# Patient Record
Sex: MALE | Race: WHITE
[De-identification: names, ages, dates, MRNs, and addresses within clinical notes are randomized per-mention and may not be internally consistent; named-entity substitution may affect disease eponyms.]

---

## 2020-04-27 ENCOUNTER — HOSPITAL ENCOUNTER (EMERGENCY)
Dept: HOSPITAL 4 - SED | Age: 85
Discharge: PSYCHIATRIC OR SUBSTANCE USE FACILITY | End: 2020-04-27
Payer: COMMERCIAL

## 2020-04-27 ENCOUNTER — HOSPITAL ENCOUNTER (INPATIENT)
Dept: HOSPITAL 36 - GERO | Age: 85
LOS: 1 days | Discharge: ANOTHER HOSPITAL | DRG: 884 | End: 2020-04-28
Attending: PSYCHIATRY & NEUROLOGY | Admitting: PSYCHIATRY & NEUROLOGY
Payer: MEDICARE

## 2020-04-27 VITALS — SYSTOLIC BLOOD PRESSURE: 104 MMHG

## 2020-04-27 VITALS — SYSTOLIC BLOOD PRESSURE: 102 MMHG | WEIGHT: 150 LBS | HEIGHT: 70 IN | BODY MASS INDEX: 21.47 KG/M2

## 2020-04-27 VITALS — SYSTOLIC BLOOD PRESSURE: 143 MMHG | DIASTOLIC BLOOD PRESSURE: 73 MMHG

## 2020-04-27 DIAGNOSIS — F03.91: Primary | ICD-10-CM

## 2020-04-27 DIAGNOSIS — F29: ICD-10-CM

## 2020-04-27 DIAGNOSIS — I10: ICD-10-CM

## 2020-04-27 DIAGNOSIS — F41.9: ICD-10-CM

## 2020-04-27 DIAGNOSIS — Z79.899: ICD-10-CM

## 2020-04-27 DIAGNOSIS — G20: ICD-10-CM

## 2020-04-27 DIAGNOSIS — R45.1: Primary | ICD-10-CM

## 2020-04-27 LAB
ALBUMIN SERPL BCP-MCNC: 3.6 G/DL (ref 3.4–4.8)
ALT SERPL W P-5'-P-CCNC: 26 U/L (ref 12–78)
AMPHETAMINES UR QL SCN: NEGATIVE
ANION GAP SERPL CALCULATED.3IONS-SCNC: 10 MMOL/L (ref 5–15)
APAP SERPL-MCNC: < 1 UG/ML (ref 1–30)
APPEARANCE UR: CLEAR
AST SERPL W P-5'-P-CCNC: 26 U/L (ref 10–37)
BACTERIA URNS QL MICRO: (no result) /HPF
BARBITURATES UR QL SCN: NEGATIVE
BASOPHILS # BLD AUTO: 0 K/UL (ref 0–0.2)
BASOPHILS NFR BLD AUTO: 0.3 % (ref 0–2)
BENZODIAZ UR QL SCN: NEGATIVE
BILIRUB SERPL-MCNC: 0.8 MG/DL (ref 0–1)
BILIRUB UR QL STRIP: NEGATIVE
BUN SERPL-MCNC: 14 MG/DL (ref 8–21)
BZE UR QL SCN: NEGATIVE
CALCIUM SERPL-MCNC: 9 MG/DL (ref 8.4–11)
CANNABINOIDS UR QL SCN: NEGATIVE
CHLORIDE SERPL-SCNC: 100 MMOL/L (ref 98–107)
CHOLEST SERPL-MCNC: 142 MG/DL (ref ?–200)
COLOR UR: YELLOW
CREAT SERPL-MCNC: 0.81 MG/DL (ref 0.55–1.3)
EOSINOPHIL # BLD AUTO: 0 K/UL (ref 0–0.4)
EOSINOPHIL NFR BLD AUTO: 0.2 % (ref 0–4)
ERYTHROCYTE [DISTWIDTH] IN BLOOD BY AUTOMATED COUNT: 14.6 % (ref 9–15)
ETHANOL SERPL-MCNC: < 3 MG/DL (ref ?–10)
GFR SERPL CREATININE-BSD FRML MDRD: (no result) ML/MIN (ref 90–?)
GLUCOSE SERPL-MCNC: 116 MG/DL (ref 70–99)
GLUCOSE UR STRIP-MCNC: NEGATIVE MG/DL
HCT VFR BLD AUTO: 42 % (ref 36–54)
HDLC SERPL-MCNC: 66 MG/DL (ref 45–?)
HGB BLD-MCNC: 14 G/DL (ref 14–18)
HGB UR QL STRIP: (no result)
KETONES UR STRIP-MCNC: NEGATIVE MG/DL
LDL CHOLESTEROL: 63 MG/DL (ref ?–100)
LEUKOCYTE ESTERASE UR QL STRIP: NEGATIVE
LYMPHOCYTES # BLD AUTO: 2.8 K/UL (ref 1–5.5)
LYMPHOCYTES NFR BLD AUTO: 17.4 % (ref 20.5–51.5)
MCH RBC QN AUTO: 30 PG (ref 27–31)
MCHC RBC AUTO-ENTMCNC: 33 % (ref 32–36)
MCV RBC AUTO: 90 FL (ref 79–98)
METHADONE UR-SCNC: NEGATIVE UMOL/L
METHAMPHET UR-SCNC: NEGATIVE UMOL/L
MONOCYTES # BLD MANUAL: 0.5 K/UL (ref 0–1)
MONOCYTES # BLD MANUAL: 3.1 % (ref 1.7–9.3)
NEUTROPHILS # BLD AUTO: 12.7 K/UL (ref 1.8–7.7)
NEUTROPHILS NFR BLD AUTO: 79 % (ref 40–70)
NITRITE UR QL STRIP: NEGATIVE
OPIATES UR QL SCN: NEGATIVE
OXYCODONE SERPL-MCNC: NEGATIVE NG/ML
PCP UR QL SCN: NEGATIVE
PH UR STRIP: 8 [PH] (ref 5–8)
PLATELET # BLD AUTO: 174 K/UL (ref 130–430)
POTASSIUM SERPL-SCNC: 4.8 MMOL/L (ref 3.5–5.1)
PROT UR QL STRIP: NEGATIVE
RBC # BLD AUTO: 4.66 MIL/UL (ref 4.2–6.2)
RBC #/AREA URNS HPF: (no result) /HPF (ref 0–3)
SODIUM SERPLBLD-SCNC: 137 MMOL/L (ref 136–145)
SP GR UR STRIP: 1.01 (ref 1–1.03)
TRICYCLICS UR-MCNC: NEGATIVE NG/ML
TRIGL SERPL-MCNC: 66 MG/DL (ref 30–150)
URINE PROPOXYPHENE SCREEN: NEGATIVE
UROBILINOGEN UR STRIP-MCNC: 1 MG/DL (ref 0.2–1)
WBC # BLD AUTO: 16 K/UL (ref 4.8–10.8)
WBC #/AREA URNS HPF: (no result) /HPF (ref 0–3)

## 2020-04-27 PROCEDURE — 83036 HEMOGLOBIN GLYCOSYLATED A1C: CPT

## 2020-04-27 PROCEDURE — 36415 COLL VENOUS BLD VENIPUNCTURE: CPT

## 2020-04-27 PROCEDURE — 80307 DRUG TEST PRSMV CHEM ANLYZR: CPT

## 2020-04-27 PROCEDURE — 80061 LIPID PANEL: CPT

## 2020-04-27 PROCEDURE — 87081 CULTURE SCREEN ONLY: CPT

## 2020-04-27 PROCEDURE — 81000 URINALYSIS NONAUTO W/SCOPE: CPT

## 2020-04-27 PROCEDURE — G0481 DRUG TEST DEF 8-14 CLASSES: HCPCS

## 2020-04-27 PROCEDURE — G0480 DRUG TEST DEF 1-7 CLASSES: HCPCS

## 2020-04-27 PROCEDURE — Z7610: HCPCS

## 2020-04-27 PROCEDURE — 80053 COMPREHEN METABOLIC PANEL: CPT

## 2020-04-27 PROCEDURE — 85025 COMPLETE CBC W/AUTO DIFF WBC: CPT

## 2020-04-27 PROCEDURE — 99285 EMERGENCY DEPT VISIT HI MDM: CPT

## 2020-04-27 PROCEDURE — G0482 DRUG TEST DEF 15-21 CLASSES: HCPCS

## 2020-04-28 ENCOUNTER — HOSPITAL ENCOUNTER (INPATIENT)
Dept: HOSPITAL 4 - SED | Age: 85
LOS: 4 days | Discharge: PSYCHIATRIC OR SUBSTANCE USE FACILITY | DRG: 177 | End: 2020-05-02
Attending: INTERNAL MEDICINE | Admitting: INTERNAL MEDICINE
Payer: COMMERCIAL

## 2020-04-28 VITALS — WEIGHT: 150 LBS | BODY MASS INDEX: 22.73 KG/M2 | HEIGHT: 68 IN | SYSTOLIC BLOOD PRESSURE: 120 MMHG

## 2020-04-28 DIAGNOSIS — Z79.899: ICD-10-CM

## 2020-04-28 DIAGNOSIS — Z88.8: ICD-10-CM

## 2020-04-28 DIAGNOSIS — Z20.828: ICD-10-CM

## 2020-04-28 DIAGNOSIS — D64.9: ICD-10-CM

## 2020-04-28 DIAGNOSIS — G93.41: ICD-10-CM

## 2020-04-28 DIAGNOSIS — J69.0: Primary | ICD-10-CM

## 2020-04-28 DIAGNOSIS — Z88.6: ICD-10-CM

## 2020-04-28 DIAGNOSIS — F03.91: ICD-10-CM

## 2020-04-28 DIAGNOSIS — F29: ICD-10-CM

## 2020-04-28 DIAGNOSIS — G20: ICD-10-CM

## 2020-04-28 LAB
ALBUMIN SERPL BCP-MCNC: 3.2 G/DL (ref 3.4–4.8)
ALT SERPL W P-5'-P-CCNC: 25 U/L (ref 12–78)
ANION GAP SERPL CALCULATED.3IONS-SCNC: 7 MMOL/L (ref 5–15)
AST SERPL W P-5'-P-CCNC: 25 U/L (ref 10–37)
BASOPHILS # BLD AUTO: 0 K/UL (ref 0–0.2)
BASOPHILS NFR BLD AUTO: 0.3 % (ref 0–2)
BILIRUB SERPL-MCNC: 1 MG/DL (ref 0–1)
BUN SERPL-MCNC: 13 MG/DL (ref 8–21)
CALCIUM SERPL-MCNC: 9.1 MG/DL (ref 8.4–11)
CHLORIDE SERPL-SCNC: 99 MMOL/L (ref 98–107)
CREAT SERPL-MCNC: 0.73 MG/DL (ref 0.55–1.3)
EOSINOPHIL # BLD AUTO: 0 K/UL (ref 0–0.4)
EOSINOPHIL NFR BLD AUTO: 0.3 % (ref 0–4)
ERYTHROCYTE [DISTWIDTH] IN BLOOD BY AUTOMATED COUNT: 14.3 % (ref 9–15)
GFR SERPL CREATININE-BSD FRML MDRD: (no result) ML/MIN (ref 90–?)
GLUCOSE SERPL-MCNC: 111 MG/DL (ref 70–99)
HCT VFR BLD AUTO: 38.1 % (ref 36–54)
HGB BLD-MCNC: 12.9 G/DL (ref 14–18)
INR PPP: 1 (ref 0.8–1.2)
LYMPHOCYTES # BLD AUTO: 1.8 K/UL (ref 1–5.5)
LYMPHOCYTES NFR BLD AUTO: 18.3 % (ref 20.5–51.5)
MCH RBC QN AUTO: 30 PG (ref 27–31)
MCHC RBC AUTO-ENTMCNC: 34 % (ref 32–36)
MCV RBC AUTO: 90 FL (ref 79–98)
MONOCYTES # BLD MANUAL: 0.4 K/UL (ref 0–1)
MONOCYTES # BLD MANUAL: 4.4 % (ref 1.7–9.3)
NEUTROPHILS # BLD AUTO: 7.7 K/UL (ref 1.8–7.7)
NEUTROPHILS NFR BLD AUTO: 76.7 % (ref 40–70)
PLATELET # BLD AUTO: 271 K/UL (ref 130–430)
POTASSIUM SERPL-SCNC: 4.1 MMOL/L (ref 3.5–5.1)
PROTHROMBIN TIME: 10.3 SECS (ref 9.5–12.5)
RBC # BLD AUTO: 4.25 MIL/UL (ref 4.2–6.2)
SODIUM SERPLBLD-SCNC: 137 MMOL/L (ref 136–145)
WBC # BLD AUTO: 10 K/UL (ref 4.8–10.8)

## 2020-04-28 PROCEDURE — U0002 COVID-19 LAB TEST NON-CDC: HCPCS

## 2020-04-28 PROCEDURE — G0378 HOSPITAL OBSERVATION PER HR: HCPCS

## 2020-04-28 PROCEDURE — G0481 DRUG TEST DEF 8-14 CLASSES: HCPCS

## 2020-04-28 PROCEDURE — G0480 DRUG TEST DEF 1-7 CLASSES: HCPCS

## 2020-04-28 PROCEDURE — G0482 DRUG TEST DEF 15-21 CLASSES: HCPCS

## 2020-04-28 NOTE — PSYCHIATRIC EVALUATION
DATE OF SERVICE:  04/28/2020



JUSTIFICATION FOR HOSPITALIZATION:  Agitation, irritated, striking out at staff.



HISTORY OF PRESENT ILLNESS:  An 84-year-old male apparently aggressive,

agitated, banging on tables, poor sleep, irritated, striking out at nursing

staff, essentially refusing to speak with me this morning, but per staff, he has

been yelling and throwing himself off his chair, throwing his feces around the

facility.



PAST PSYCHIATRIC HISTORY:  Unclear.  Noted dementia.



FAMILY HISTORY:  Unclear.



SOCIAL HISTORY:  Apparently, the patient told staff he is from [facility], I am

not sure.  Per the facesheet, it looks like he is coming from a skilled nursing

in [place].  Wife and daughter listed on the facesheet, involvement unclear.



MEDICAL HISTORY:  Noted.



MEDICATIONS:  Noted.



MENTAL STATUS EXAMINATION:  Stated age, sleeping, arousable, refusing to speak

with me.  Unable to assess further.  Concerns for impulse control, agitation.



DIAGNOSES:  Dementia, dementia with behaviors; mood, unspecified; anxiety,

unspecified; psychosis, unspecified.



MEDICAL:  Please see full H and P.



ESTIMATED LENGTH OF STAY:  7-10 days.



ASSESSMENT:  The patient requiring hospitalization, aggressive, agitated,

striking out, worsening behaviors, unruly.  Nursing staff concerned about their

own safety.  The patient apparently also trying to throw himself off the chair,

throwing feces, which is documented.



PLAN:  Treatment plan includes group as well as milieu therapy.  We will make

appropriate medication adjustments.



CONDITIONS FOR DISCHARGE:  Improved mood, improved affect, better control of any

aggressive symptom.





DD: 04/28/2020 12:04

DT: 04/28/2020 12:20

JOB# [id number]  [id number]

## 2020-04-28 NOTE — CONSULTATION
DATE OF CONSULTATION:04/28/20  



INTERNAL MEDICINE CONSULTATION



HISTORY OF PRESENT ILLNESS:  We have 84-year-old male with dementia and

Parkinson who is transferred from nursing home for agitation.  The patient was

combative.  The patient was throwing his feces at people.  The patient was

screaming and yelling.



At this time, the patient was confused.  The patient was cleared at [facility].



PAST MEDICAL HISTORY:

1.  Parkinson.

2.  Hypertension.

3.  Dementia.



MEDICATIONS:  List reviewed.



ALLERGIES:  None.



SOCIAL HISTORY:  Unobtainable.



REVIEW OF SYSTEMS:  Difficult to obtain.



PHYSICAL EXAMINATION:

VITAL SIGNS:  Temperature is 98.0, pulse 91, respirations 20, blood pressure

109/59, satting 98%.

HEENT:  Normocephalic, atraumatic head exam.

NECK:  Supple.

CARDIOVASCULAR:  Regular rate and rhythm.

LUNGS:  Decreased breath sounds.

ABDOMEN:  Soft, nontender.

EXTREMITIES:  No edema, cyanosis or clubbing.



ASSESSMENT AND PLAN:

1.  End-stage dementia with behavioral manifestations.

2.  Parkinson.

3.  Anxiety.

4.  Acute psychosis.



The patient will continue with supportive care.  I reviewed the medical records

from the nursing home.  I have discussed the care plan with nursing staff.





DD: 04/28/2020 16:33

DT: 04/28/2020 19:37

JOB# [id number]  [id number]

MELISSA

## 2020-04-29 VITALS — SYSTOLIC BLOOD PRESSURE: 104 MMHG

## 2020-04-29 VITALS — SYSTOLIC BLOOD PRESSURE: 111 MMHG

## 2020-04-29 VITALS — SYSTOLIC BLOOD PRESSURE: 100 MMHG

## 2020-04-29 VITALS — SYSTOLIC BLOOD PRESSURE: 102 MMHG

## 2020-04-29 VITALS — SYSTOLIC BLOOD PRESSURE: 101 MMHG

## 2020-04-29 VITALS — SYSTOLIC BLOOD PRESSURE: 115 MMHG

## 2020-04-29 RX ADMIN — DEXTROSE MONOHYDRATE SCH MLS/HR: 50 INJECTION, SOLUTION INTRAVENOUS at 11:00

## 2020-04-29 RX ADMIN — PROPRANOLOL HYDROCHLORIDE SCH MG: 10 TABLET ORAL at 21:39

## 2020-04-29 RX ADMIN — PROPRANOLOL HYDROCHLORIDE SCH MG: 10 TABLET ORAL at 09:00

## 2020-04-29 RX ADMIN — PROPRANOLOL HYDROCHLORIDE SCH MG: 10 TABLET ORAL at 21:00

## 2020-04-29 RX ADMIN — CARBIDOPA AND LEVODOPA SCH TAB: 25; 100 TABLET ORAL at 21:38

## 2020-04-29 RX ADMIN — CARBIDOPA AND LEVODOPA SCH TAB: 25; 100 TABLET ORAL at 15:24

## 2020-04-29 RX ADMIN — CARBIDOPA AND LEVODOPA SCH TAB: 25; 100 TABLET ORAL at 09:25

## 2020-04-30 VITALS — SYSTOLIC BLOOD PRESSURE: 108 MMHG

## 2020-04-30 VITALS — SYSTOLIC BLOOD PRESSURE: 117 MMHG

## 2020-04-30 VITALS — SYSTOLIC BLOOD PRESSURE: 93 MMHG

## 2020-04-30 VITALS — SYSTOLIC BLOOD PRESSURE: 122 MMHG

## 2020-04-30 VITALS — SYSTOLIC BLOOD PRESSURE: 95 MMHG

## 2020-04-30 VITALS — SYSTOLIC BLOOD PRESSURE: 106 MMHG

## 2020-04-30 RX ADMIN — DONEPEZIL HYDROCHLORIDE SCH MG: 5 TABLET, FILM COATED ORAL at 20:36

## 2020-04-30 RX ADMIN — TRAZODONE HYDROCHLORIDE SCH MG: 50 TABLET ORAL at 20:35

## 2020-04-30 RX ADMIN — CARBIDOPA AND LEVODOPA SCH TAB: 25; 100 TABLET ORAL at 14:16

## 2020-04-30 RX ADMIN — CARBIDOPA AND LEVODOPA SCH TAB: 25; 100 TABLET ORAL at 20:35

## 2020-04-30 RX ADMIN — CARBIDOPA AND LEVODOPA SCH TAB: 25; 100 TABLET ORAL at 09:00

## 2020-04-30 RX ADMIN — DEXTROSE MONOHYDRATE SCH MLS/HR: 50 INJECTION, SOLUTION INTRAVENOUS at 10:21

## 2020-04-30 RX ADMIN — PROPRANOLOL HYDROCHLORIDE SCH MG: 10 TABLET ORAL at 21:00

## 2020-04-30 RX ADMIN — PROPRANOLOL HYDROCHLORIDE SCH MG: 10 TABLET ORAL at 09:00

## 2020-05-01 VITALS — SYSTOLIC BLOOD PRESSURE: 97 MMHG

## 2020-05-01 VITALS — SYSTOLIC BLOOD PRESSURE: 112 MMHG

## 2020-05-01 VITALS — SYSTOLIC BLOOD PRESSURE: 107 MMHG

## 2020-05-01 VITALS — SYSTOLIC BLOOD PRESSURE: 110 MMHG

## 2020-05-01 VITALS — SYSTOLIC BLOOD PRESSURE: 103 MMHG

## 2020-05-01 RX ADMIN — DONEPEZIL HYDROCHLORIDE SCH MG: 5 TABLET, FILM COATED ORAL at 20:51

## 2020-05-01 RX ADMIN — CARBIDOPA AND LEVODOPA SCH TAB: 25; 100 TABLET ORAL at 15:07

## 2020-05-01 RX ADMIN — PROPRANOLOL HYDROCHLORIDE SCH MG: 10 TABLET ORAL at 09:00

## 2020-05-01 RX ADMIN — DEXTROSE MONOHYDRATE SCH MLS/HR: 50 INJECTION, SOLUTION INTRAVENOUS at 10:12

## 2020-05-01 RX ADMIN — CARBIDOPA AND LEVODOPA SCH TAB: 25; 100 TABLET ORAL at 20:51

## 2020-05-01 RX ADMIN — TRAZODONE HYDROCHLORIDE SCH MG: 50 TABLET ORAL at 20:51

## 2020-05-01 RX ADMIN — PROPRANOLOL HYDROCHLORIDE SCH MG: 10 TABLET ORAL at 21:00

## 2020-05-01 RX ADMIN — CARBIDOPA AND LEVODOPA SCH TAB: 25; 100 TABLET ORAL at 08:43

## 2020-05-02 ENCOUNTER — HOSPITAL ENCOUNTER (INPATIENT)
Dept: HOSPITAL 36 - GERO | Age: 85
LOS: 9 days | Discharge: ANOTHER HOSPITAL | DRG: 884 | End: 2020-05-11
Attending: PSYCHIATRY & NEUROLOGY | Admitting: PSYCHIATRY & NEUROLOGY
Payer: MEDICARE

## 2020-05-02 VITALS — SYSTOLIC BLOOD PRESSURE: 121 MMHG

## 2020-05-02 VITALS — SYSTOLIC BLOOD PRESSURE: 133 MMHG

## 2020-05-02 VITALS — SYSTOLIC BLOOD PRESSURE: 102 MMHG

## 2020-05-02 VITALS — SYSTOLIC BLOOD PRESSURE: 116 MMHG

## 2020-05-02 DIAGNOSIS — J18.9: ICD-10-CM

## 2020-05-02 DIAGNOSIS — I10: ICD-10-CM

## 2020-05-02 DIAGNOSIS — Z79.899: ICD-10-CM

## 2020-05-02 DIAGNOSIS — F03.91: Primary | ICD-10-CM

## 2020-05-02 DIAGNOSIS — Z20.828: ICD-10-CM

## 2020-05-02 DIAGNOSIS — F41.9: ICD-10-CM

## 2020-05-02 PROCEDURE — Z7610: HCPCS

## 2020-05-02 RX ADMIN — CARBIDOPA AND LEVODOPA SCH TAB: 25; 100 TABLET ORAL at 09:07

## 2020-05-02 RX ADMIN — CARBIDOPA AND LEVODOPA SCH TAB: 25; 100 TABLET ORAL at 15:00

## 2020-05-02 RX ADMIN — DEXTROSE MONOHYDRATE SCH MLS/HR: 50 INJECTION, SOLUTION INTRAVENOUS at 11:05

## 2020-05-02 RX ADMIN — CARBIDOPA AND LEVODOPA SCH TAB: 10; 100 TABLET ORAL at 21:08

## 2020-05-02 RX ADMIN — PROPRANOLOL HYDROCHLORIDE SCH MG: 10 TABLET ORAL at 09:07

## 2020-05-03 VITALS — SYSTOLIC BLOOD PRESSURE: 108 MMHG | DIASTOLIC BLOOD PRESSURE: 62 MMHG

## 2020-05-03 RX ADMIN — IPRATROPIUM BROMIDE AND ALBUTEROL SULFATE SCH: .5; 3 SOLUTION RESPIRATORY (INHALATION) at 15:19

## 2020-05-03 RX ADMIN — CARBIDOPA AND LEVODOPA SCH TAB: 10; 100 TABLET ORAL at 15:23

## 2020-05-03 RX ADMIN — IPRATROPIUM BROMIDE AND ALBUTEROL SULFATE SCH: .5; 3 SOLUTION RESPIRATORY (INHALATION) at 19:00

## 2020-05-03 RX ADMIN — CARBIDOPA AND LEVODOPA SCH TAB: 10; 100 TABLET ORAL at 20:45

## 2020-05-03 RX ADMIN — CARBIDOPA AND LEVODOPA SCH TAB: 10; 100 TABLET ORAL at 09:20

## 2020-05-03 NOTE — CONSULTATION
DATE OF CONSULTATION:  05/03/2020



INTERNAL MEDICINE CONSULTATION



HISTORY OF PRESENT ILLNESS:  We have an 84-year-old male with dementia with

psychosis who is transferred from [facility].  The patient was sent

there to rule out for COVID-19.  The patient's COVID negative x 2.  The patient

was transferred here for continuing care of pneumonia.



No chest pain or shortness of breath.  No nausea, vomiting, or abdominal pain.



PAST MEDICAL HISTORY:

1.  Dementia with psychosis.

2.  Hypertension.



MEDICATIONS:  List reviewed.



ALLERGIES:  None.



PHYSICAL EXAMINATION:

VITAL SIGNS:  Temperature 98.1, pulse 79, respirations 20, blood pressure 93/59,

satting 96% on room air.

HEENT:  Normocephalic, atraumatic head exam.

NECK:  Supple.

CARDIOVASCULAR:  Regular rate and rhythm.

LUNGS:  Decreased breath sounds.

ABDOMEN:  Soft, nontender.

EXTREMITIES:  No edema, cyanosis or clubbing.



ASSESSMENT AND PLAN:

1.  Community-acquired pneumonia.

2.  Dementia with psychosis.



The patient will be continued on Levaquin 500 mg p.o. daily.  The patient will

get breathing treatments q. 6 hours.  The patient's pulse ox will be monitored. 

We will co-manage with the psychiatrist.





DD: 05/03/2020 11:37

DT: 05/03/2020 12:25

JOB# [id number]  [id number]

## 2020-05-03 NOTE — PSYCHIATRIC EVALUATION
DATE OF SERVICE:  05/03/2020



JUSTIFICATION FOR HOSPITALIZATION:  Originally agitation and irritation,

irritated striking out at staff.



HISTORY OF PRESENT ILLNESS:  An 84-year-old male came to this hospital, was sent

to [facility] for medical decompensation, now readmitted was apparently banging

on tables, poor sleep, irritated, striking out at nursing staff.  When I go see

him, I am not able to get much information from him.  He is refusing to talk to

me, just stares at me, does not say anything.  The patient noted to be

continuously getting out of bed, unsteady gait, notable confusion and having

hard time controlling his behaviors.  He is not listening to any staff.



PAST PSYCHIATRIC HISTORY:  Recent admission here.



FAMILY HISTORY:  Noncontributory.



SOCIAL HISTORY:  Very hard to assess, not talking.  The patient told staff he is

from [facility] coming in from a skilled nursing.



MEDICAL HISTORY:  Noted.



MEDICATIONS:  Noted.



MENTAL STATUS EXAMINATION:  Stated age.  Sleeping opens his eyes, does not talk

to me at all.  Concerns for impulse control, agitation, restlessness,

confusional state.



DIAGNOSES:  Dementia, dementia with behaviors; mood, unspecified; anxiety,

unspecified; psychosis, unspecified.



MEDICAL:  Please see full H and P.



ESTIMATED LENGTH OF STAY:  7-10 days.



ASSESSMENT:  The patient requiring hospitalization.  Aggressive behaviors,

agitation, striking out.  Poorly oriented, trying to get up, throwing himself

off the chair.  At some point, throwing feces, which was documented when he was

here originally a few days prior.



PLAN:  Treatment plan includes group as well as milieu therapy.  We will make

appropriate medication adjustments, monitor for any overt side effects.





DD: 05/03/2020 15:01

DT: 05/03/2020 17:26

JOB# [id number]  [id number]

## 2020-05-04 RX ADMIN — CARBIDOPA AND LEVODOPA SCH TAB: 10; 100 TABLET ORAL at 09:09

## 2020-05-04 RX ADMIN — CARBIDOPA AND LEVODOPA SCH TAB: 10; 100 TABLET ORAL at 20:23

## 2020-05-04 RX ADMIN — IPRATROPIUM BROMIDE AND ALBUTEROL SULFATE SCH: .5; 3 SOLUTION RESPIRATORY (INHALATION) at 08:08

## 2020-05-04 RX ADMIN — CARBIDOPA AND LEVODOPA SCH TAB: 10; 100 TABLET ORAL at 13:49

## 2020-05-04 RX ADMIN — IPRATROPIUM BROMIDE AND ALBUTEROL SULFATE SCH: .5; 3 SOLUTION RESPIRATORY (INHALATION) at 13:50

## 2020-05-04 RX ADMIN — IPRATROPIUM BROMIDE AND ALBUTEROL SULFATE SCH: .5; 3 SOLUTION RESPIRATORY (INHALATION) at 18:58

## 2020-05-04 NOTE — INTERNAL MEDICINE PROG NOTE
Internal Medicine Subjective





- Subjective


Service Date: 20


Patient seen and examined:: without staff


Patient is:: asleep





Internal Medicine Objective





- Results


Recent Labs: 


 Laboratory Last Values











POC Glucose  145 MG/DL (70 - 105)  H  20  20:25    














- Physical Exam


Vitals and I&O: 


 Vital Signs











Temp  98.2 F   20 06:26


 


Pulse  77   20 09:09


 


Resp  18   20 06:26


 


BP  111/67   20 09:09


 


Pulse Ox  98   20 06:26








 Intake & Output











 20





 18:59 06:59 18:59


 


Intake Total 900  


 


Balance 900  


 


Intake:   


 


  Oral 900  


 


Other:   


 


  # Voids  3 


 


  # Bowel Movements  0 


 


  Stool Characteristics  Soft 





  Formed 





  Brown 











Active Medications: 


Current Medications





Acetaminophen (Tylenol)  650 mg PO Q4H PRN


   PRN Reason: Pain (Mild 1-3)


   Stop: 20 19:32


Acetaminophen (Tylenol Extra Strength)  1,000 mg PO Q6H PRN


   PRN Reason: Pain (Moderate 4-6)


   Stop: 20 19:32


Al Hydrox/Mg Hydrox/Simethicone (Maalox)  30 ml PO Q4HR PRN


   PRN Reason: GI DISTRESS


   Stop: 20 19:32


Albuterol/Ipratropium (Duoneb Neb)  3 ml HHN [id number] [facility]


   Stop: 20 12:59


   Last Admin: 20 08:08 Dose:  Not Given


Bisacodyl (Dulcolax 10 Mg Supp)  10 mg RC DAILY PRN


   PRN Reason: constipation


   Stop: 20 11:23


Carbidopa/Levodopa (Sinemet 10 Mg-100 Mg)  1 tab PO TID [facility]


   Stop: 20 20:59


   Last Admin: 20 09:09 Dose:  1 tab


Donepezil HCl (Aricept)  5 mg PO DAILY [facility]


   Stop: 20 08:59


   Last Admin: 20 09:08 Dose:  5 mg


Ibuprofen (Motrin)  400 mg PO Q4H PRN


   PRN Reason: Pain (Severe 7-10)


   Stop: 20 19:32


Levofloxacin (Levaquin)  500 mg PO DAILY [facility]


   Stop: 20 08:59


   Last Admin: 20 09:08 Dose:  500 mg


Magnesium Hydroxide (Milk Of Magnesia)  30 ml PO HS PRN


   PRN Reason: Constipation


Multivitamins/Vitamin C (Theragran)  1 tab PO DAILY [facility]


   Stop: 20 08:59


   Last Admin: 20 09:09 Dose:  1 tab


Propranolol HCl (Inderal)  10 mg PO BID [facility]


   Stop: 20 08:59


   Last Admin: 20 09:09 Dose:  10 mg


Zolpidem Tartrate (Ambien)  5 mg PO HS PRN


   PRN Reason: Insomnia


   Stop: 20 19:32








General: demented


HEENT: NC/AT


Neck: Supple


Lungs: CTAB


Cardiovascular: RRR, Normal S1, Normal S2


Abdomen: soft, non-tender


Extremities: clear





Internal Medicine Assmt/Plan





- Assessment


Assessment: 





1.  Community-acquired pneumonia


2.  HTN


3.  Dementia with psychosis





- Plan


Plan: 





continue supportive care.


continue levaquin 500 mg daily


patient does not appear to be in respiratory distress





Nutritional Asmnt/Malnutr-PDOC





- Dietary Evaluation


Malnutrition Findings (Please click <Entered> for more info): 








Nutritional Asmnt/Malnutrition                             Start:  20 13:

10


Text:                                                      Status: Complete    

  


Freq:                                                                          

  


Protocol:                                                                      

  


 Document     20 13:10  ESSIE  (Rec: 20 13:15  ESSIE  [email or web address]

-01)


 Nutritional Asmnt/Malnutrition


     Patient General Information


      Nutritional Screening                      High Risk


      Diagnosis                                  Dementia with psychosis


      Pertinent Medical Hx/Surgical Hx           Dementia with psychosis, HTN


      Subjective Information                     Pt is a 84-year-old male re-


                                                 admitted on  d/t psychosis


                                                 after being sent to [facility] to r/o COVID-19.  Pt


                                                 ate 50% dinner and snacks on  per Meal/Nutrition Activity


                                                 Record.


                                                 Recommend adding fish oil to


                                                 diet regimen d/t dry skin and


                                                 low BMI. Spoke with pt nurse


                                                 Nico today pertaining to


                                                 recommendation. Let nurse know


                                                 we can get pt higher calorie


                                                 snacks in the kitchen and


                                                 provide softer food choices as


                                                 the chopped burger seemed to


                                                 be too tough for pt. Provided


                                                 nurse with food options to


                                                 discuss with pt at snack and


                                                 meal time.


                                                 Anthropometrics


                                                 HT: 511


                                                 WT: 111 LB (50.45 kg)


                                                 BMI: 15.50 (Underweight)


                                                 GI/ Skin Integrity


                                                 GI: WNL, Soft, Flat, Non-


                                                 tender


                                                 BM: 5/2 x1


                                                 I/O: 300/1 (+299)


                                                 Skin: WNL, Intact, Dryness


                                                 Yoshi: 16


                                                 Diet Order: Mechanical Soft


                                                 Estimated Energy Needs: (


                                                 Underweight, CBW)


                                                 4684-2467 kcals (30-35 kcals/


                                                 kg)


                                                 60-65g Pro (1.2-1.3 g/kg)


                                                 3317-1477 ml (30-25 ml/kg)


      Current Diet Order/ Nutrition Support      Mechanical Soft


      Pertinent Medications                      Maalox (PRN), Albuterol (PRN),


                                                 Dulcolax (PRN), MOM (PRN),


                                                 Theragran


      Pertinent Labs                             POC Glucose (last 24 hours):


                                                 145


     Nutritional Hx/Data


      Height                                     1.8 m


      Height (Calculated Centimeters)            180.3


      Current Weight (lbs)                       50.349 kg


      Weight (Calculated Kilograms)              50.3


      Weight (Calculated Grams)                  47160.8


      Ideal Body Weight                          172 LB (78.18 kg)


      % Ideal Body Weight                        65


      Body Mass Index (BMI)                      15.5


      Weight Status                              Emaciated


     GI Symptoms


      Last BM                                    52 x1


      Skin Integrity/Comment:                    Skin: WNL, Intact, Dryness


                                                 Yoshi: 16


      Current %PO                                Fair (50-74%)


     Estimated Nutritional Goals


      BEE in Kcals:                              Using Current wt


      Calories/Kcals/Kg                          30-35


      Kcals Calculated                           1301-3924


      Protein:                                   Using Current wt


      Protein g/k.2-1.3


      Protein Calculated                         60-65


      Fluid: ml                                  3668-0370 ml (30-25 ml/kg)


     Nutritional Problem


      1. Problem


       Problem                                   Underweight


       Etiology                                  r/t consistent energy


                                                 underconsumption


       Signs/Symptoms:                           aeb BMI >18.5 (15.5).


     Malnutrition Related to Morbid Obesity


      Malnutrition related to morbid obesity     No


     Intervention/Recommendation


      Comments                                   1. Continue Mechanical Soft


                                                 diet as tolerated.


                                                 2. Add Ensure Enlive TID (


                                                 completed).


                                                 3. Recommend adding fish oil


                                                 to medication regimen.


     Expected Outcomes/Goals


      Expected Outcomes/Goals                    1. PO intake to meet >75% of


                                                 estimated nutritional needs.


                                                 2. Gradual weight gain (1.0 LB


                                                 /week) trending toward IBW


                                                 preferred.


                                                 3. Weekly weight checks.


                                                 4. Monitor PO intake, wt,


                                                 nutrition related labs, and


                                                 skin integrity.


                                                 5. F/U as high risk in 2-3


                                                 days, 5/5-5/6.

## 2020-05-04 NOTE — PROGRESS NOTES
DATE:  05/04/2020



SUBJECTIVE:  An 84-year-old male, currently in the hospital, dementia,

confusion.  When I approached him, he asked in a language he speaks, he says he

can speak English, but he does not really answering any of my questions

whatsoever, just asks for water.  Ongoing disorientation.  It seems he has a

history of dementia.  He has been generally calmer and not exhibiting any

behavioral problems, no agitation, no escalation.  Mostly withdrawn, keeps to

self, fair sleep and appetite.



Medications were reviewed.  Labs were reviewed.  Vitals were reviewed.  No overt

side effects.



MENTAL STATUS EXAMINATION:  Stated age, calm, awake, alert, but confused,

somewhat impulsive, but not creating any sort of disturbance.  Poor orientation.



DIAGNOSIS:  Dementia.



PLAN:  We will continue inpatient monitoring.  We will attempt to contact family

and it seems that the patient may be approaching his baseline, it is unclear. 

We will monitor for any impulse control issues or changes in mental status.





DD: 05/04/2020 19:31

DT: 05/04/2020 19:41

[facility]# [id number]  [id number]

## 2020-05-05 RX ADMIN — IPRATROPIUM BROMIDE AND ALBUTEROL SULFATE SCH: .5; 3 SOLUTION RESPIRATORY (INHALATION) at 06:50

## 2020-05-05 RX ADMIN — CARBIDOPA AND LEVODOPA SCH TAB: 10; 100 TABLET ORAL at 20:57

## 2020-05-05 RX ADMIN — CARBIDOPA AND LEVODOPA SCH TAB: 10; 100 TABLET ORAL at 09:32

## 2020-05-05 RX ADMIN — IPRATROPIUM BROMIDE AND ALBUTEROL SULFATE SCH: .5; 3 SOLUTION RESPIRATORY (INHALATION) at 12:36

## 2020-05-05 RX ADMIN — IPRATROPIUM BROMIDE AND ALBUTEROL SULFATE SCH: .5; 3 SOLUTION RESPIRATORY (INHALATION) at 18:34

## 2020-05-05 RX ADMIN — CARBIDOPA AND LEVODOPA SCH TAB: 10; 100 TABLET ORAL at 14:47

## 2020-05-05 NOTE — INTERNAL MEDICINE PROG NOTE
Chief Complaint: CKD4    HPI: Jeffy Serrano is a 89 year old male for follow up on CKD and associated complications.    REVIEW OF SYSTEMS    Constitutional: Negative for fever, chills, diaphoresis, activity change, appetite change, fatigue and unexpected weight change.  HENT: Negative for nosebleeds, sore throat, facial swelling, mouth sores, trouble swallowing, neck pain, neck stiffness and postnasal drip.  Eyes: Negative for pain, discharge and redness.  Respiratory: Negative for apnea, cough, chest tightness, shortness of breath and wheezing.  Cardiovascular: Negative for chest pain and palpitations.  Gastrointestinal: Negative for nausea, vomiting, abdominal pain, diarrhea and abdominal distention.  Genitourinary: Negative for dysuria, urgency, frequency, hematuria, flank pain, decreased urine volume and difficulty urinating.  Musculoskeletal: Negative for myalgias, back pain, joint swelling, arthralgias and gait problem.  Skin: Negative for pallor and rash.  Neurological: Negative for dizziness, tremors, speech difficulty, weakness, numbness and headaches.  Hematological: Does not bruise/bleed easily.  Psychiatric/Behavioral: Negative for confusion, sleep disturbance and agitation. The patient is not nervous/anxious.    Past Medical History:   Diagnosis Date   • Benign neoplasm of colon 12/3/08    tubular adenoma   • Cerebral embolism with cerebral infarction (CMS/HCC) 12/25/03   • Diabetes mellitus with stage 3 chronic kidney disease (CMS/HCC) 1/15/2015   • Diverticulosis of colon (without mention of hemorrhage) 12/3/08    scattered   • Iron deficiency anemia, unspecified 12/3/08   • Other specified gastritis without mention of hemorrhage 12/3/08    H Pylori negative   • Phimosis 8/17/2017   • Prostate cancer (CMS/HCC) 3/5/15    Diagnosed at [facility]   • Unspecified deficiency anemia 7/27/2011   • Unspecified hemorrhoids without mention of complication 12/3/08    internal     ALLERGIES:  No Known  Internal Medicine Subjective





- Subjective


Service Date: 20


Patient seen and examined:: without staff


Patient is:: asleep





Internal Medicine Objective





- Results


Recent Labs: 


 Laboratory Last Values











POC Glucose  145 MG/DL (70 - 105)  H  20  20:25    














- Physical Exam


Vitals and I&O: 


 Vital Signs











Temp  98.0 F   20 14:00


 


Pulse  69   20 14:00


 


Resp  20   20 14:00


 


BP  102/58   20 14:00


 


Pulse Ox  95   20 14:00








 Intake & Output











 20





 18:59 06:59 18:59


 


Intake Total 960 360 


 


Output Total  1 


 


Balance 960 359 


 


Intake:   


 


  Oral 960 360 


 


Output:   


 


  Urine/Stool Mix  1 


 


Other:   


 


  # Voids 3 2 


 


  # Bowel Movements 0 0 


 


  Stool Characteristics Soft Soft Soft





 Formed Formed Formed





 Brown Brown Brown











Active Medications: 


Current Medications





Acetaminophen (Tylenol)  650 mg PO Q4H PRN


   PRN Reason: Pain (Mild 1-3)


   Stop: 20 19:32


Acetaminophen (Tylenol Extra Strength)  1,000 mg PO Q6H PRN


   PRN Reason: Pain (Moderate 4-6)


   Stop: 20 19:32


Al Hydrox/Mg Hydrox/Simethicone (Maalox)  30 ml PO Q4HR PRN


   PRN Reason: GI DISTRESS


   Stop: 20 19:32


Albuterol/Ipratropium (Duoneb Neb)  3 ml HHN [id number] Former[facility], [facility]


   Stop: 20 12:59


   Last Admin: 20 12:36 Dose:  Not Given


Bisacodyl (Dulcolax 10 Mg Supp)  10 mg RC DAILY PRN


   PRN Reason: constipation


   Stop: 20 11:23


Carbidopa/Levodopa (Sinemet 10 Mg-100 Mg)  1 tab PO TID [facility]


   Stop: 20 20:59


   Last Admin: 20 14:47 Dose:  1 tab


Donepezil HCl (Aricept)  5 mg PO DAILY [facility]


   Stop: 20 08:59


   Last Admin: 20 09:31 Dose:  5 mg


Ibuprofen (Motrin)  400 mg PO Q4H PRN


   PRN Reason: Pain (Severe 7-10)


   Stop: 20 19:32


Levofloxacin (Levaquin)  500 mg PO DAILY [facility]


   Stop: 20 08:59


   Last Admin: 20 09:31 Dose:  500 mg


Magnesium Hydroxide (Milk Of Magnesia)  30 ml PO HS PRN


   PRN Reason: Constipation


Multivitamins/Vitamin C (Theragran)  1 tab PO DAILY PHILOMENA


   Stop: 20 08:59


   Last Admin: 20 09:31 Dose:  1 tab


Propranolol HCl (Inderal)  10 mg PO BID [facility]


   Stop: 20 08:59


   Last Admin: 20 09:31 Dose:  10 mg


Zolpidem Tartrate (Ambien)  5 mg PO HS PRN


   PRN Reason: Insomnia


   Stop: 20 19:32








General: demented


HEENT: NC/AT


Neck: Supple


Lungs: CTAB


Cardiovascular: RRR, Normal S1, Normal S2


Abdomen: soft, non-tender


Extremities: clear





Internal Medicine Assmt/Plan





- Assessment


Assessment: 





1.  Community-acquired pneumonia


2.  HTN


3.  Dementia with psychosis





- Plan


Plan: 





continue supportive care.


continue levaquin 500 mg daily


d.w r.n.





Nutritional Asmnt/Malnutr-PDOC





- Dietary Evaluation


Malnutrition Findings (Please click <Entered> for more info): 








Nutritional Asmnt/Malnutrition                             Start:  20 13:

10


Text:                                                      Status: Complete    

  


Freq:                                                                          

  


Protocol:                                                                      

  


 Document     20 13:10  ESSIE  (Rec: 20 13:15  ESSIE  [email or web address]

-01)


 Nutritional Asmnt/Malnutrition


     Patient General Information


      Nutritional Screening                      High Risk


      Diagnosis                                  Dementia with psychosis


      Pertinent Medical Hx/Surgical Hx           Dementia with psychosis, HTN


      Subjective Information                     Pt is a 84-year-old male re-


                                                 admitted on  d/t psychosis


                                                 after being sent to [facility] to r/o COVID-19.  Pt


                                                 ate 50% dinner and snacks on  per Meal/Nutrition Activity


                                                 Record.


                                                 Recommend adding fish oil to


                                                 diet regimen d/t dry skin and


                                                 low BMI. Spoke with pt nurse


                                                 Nico today pertaining to


                                                 recommendation. Let nurse know


                                                 we can get pt higher calorie


                                                 snacks in the kitchen and


                                                 provide softer food choices as


                                                 the chopped burger seemed to


                                                 be too tough for pt. Provided


                                                 nurse with food options to


                                                 discuss with pt at snack and


                                                 meal time.


                                                 Anthropometrics


                                                 HT: 511


                                                 WT: 111 LB (50.45 kg)


                                                 BMI: 15.50 (Underweight)


                                                 GI/ Skin Integrity


                                                 GI: WNL, Soft, Flat, Non-


                                                 tender


                                                 BM: 5/2 x1


                                                 I/O: 300/1 (+299)


                                                 Skin: WNL, Intact, Dryness


                                                 Yoshi: 16


                                                 Diet Order: Mechanical Soft


                                                 Estimated Energy Needs: (


                                                 Underweight, CBW)


                                                 0070-6994 kcals (30-35 kcals/


                                                 kg)


                                                 60-65g Pro (1.2-1.3 g/kg)


                                                 9953-6573 ml (30-25 ml/kg)


      Current Diet Order/ Nutrition Support      Mechanical Soft


      Pertinent Medications                      Maalox (PRN), Albuterol (PRN),


                                                 Dulcolax (PRN), MOM (PRN),


                                                 Theragran


      Pertinent Labs                             POC Glucose (last 24 hours):


                                                 145


     Nutritional Hx/Data


      Height                                     1.8 m


      Height (Calculated Centimeters)            180.3


      Current Weight (lbs)                       50.349 kg


      Weight (Calculated Kilograms)              50.3


      Weight (Calculated Grams)                  51416.8


      Ideal Body Weight                          172 LB (78.18 kg)


      % Ideal Body Weight                        65


      Body Mass Index (BMI)                      15.5


      Weight Status                              Emaciated


     GI Symptoms


      Last BM                                    5/2 x1


      Skin Integrity/Comment:                    Skin: WNL, Intact, Dryness


                                                 Yoshi: 16


      Current %PO                                Fair (50-74%)


     Estimated Nutritional Goals


      BEE in Kcals:                              Using Current wt


      Calories/Kcals/Kg                          30-35


      Kcals Calculated                           3199-2127


      Protein:                                   Using Current wt


      Protein g/k.2-1.3


      Protein Calculated                         60-65


      Fluid: ml                                  4984-6443 ml (30-25 ml/kg)


     Nutritional Problem


      1. Problem


       Problem                                   Underweight


       Etiology                                  r/t consistent energy


                                                 underconsumption


       Signs/Symptoms:                           aeb BMI >18.5 (15.5).


     Malnutrition Related to Morbid Obesity


      Malnutrition related to morbid obesity     No


     Intervention/Recommendation


      Comments                                   1. Continue Mechanical Soft


                                                 diet as tolerated.


                                                 2. Add Ensure Enlive TID (


                                                 completed).


                                                 3. Recommend adding fish oil


                                                 to medication regimen.


     Expected Outcomes/Goals


      Expected Outcomes/Goals                    1. PO intake to meet >75% of


                                                 estimated nutritional needs.


                                                 2. Gradual weight gain (1.0 LB


                                                 /week) trending toward IBW


                                                 preferred.


                                                 3. Weekly weight checks.


                                                 4. Monitor PO intake, wt,


                                                 nutrition related labs, and


                                                 skin integrity.


                                                 5. F/U as high risk in 2-3


                                                 days, -. Allergies    Current Outpatient Prescriptions   Medication Sig Dispense Refill   • ergocalciferol (DRISDOL) 50242 units capsule Take 1 capsule by mouth once a week. 4 capsule 12   • ferrous sulfate 325 (65 FE) MG tablet Take 1 tablet by mouth daily (with breakfast). 30 tablet 1   • simvastatin (ZOCOR) 20 MG tablet TAKE ONE TABLET BY MOUTH NIGHTLY 90 tablet 1   • tamsulosin (FLOMAX) 0.4 MG Cap Take 1 capsule by mouth daily after a meal. ONE HALF HOUR AFTER EVENING MEAL 90 capsule 1   • nadolol (CORGARD) 20 MG tablet Take 0.5 tablets by mouth daily. 45 tablet 3   • Glucose Blood (ACCU-CHEK AUSTIN) strip Test blood sugars 3-4 times a day. 50 strip 11   • Lancets (ACCU-CHEK MULTICLIX) MISC As directed 102 each 6   • ASPIRIN CHILD 81 MG PO CHEW 1 TABLET DAILY 30 0     No current facility-administered medications for this visit.        PHYSICAL EXAM    Constitutional: He  is oriented to person, place, and time. He appears well-developed and well-nourished.  He is active and cooperative.  Non-toxic appearance.  He does not have a sickly appearance.  He does not appear ill. No distress.  Visit Vitals  /62   Pulse 54   Wt 70.8 kg   BMI 28.53 kg/m²     Head: Normocephalic and atraumatic.  Nose: Nose normal.  Mouth/Throat: Oropharynx is clear and moist.  Eyes: Conjunctivae and EOM are normal. Right eye exhibits no discharge, Left eye exhibits no discharge. No scleral icterus.  Neck: Normal range of motion. Neck supple.  No JVD present. No tracheal tenderness present.  No tracheal deviation present. No thyromegaly present.  Cardiovascular: Normal rate, regular rhythm, normal heart sounds, intact distal pulses and normal pulses. Exam reveals no friction rub.  Pulmonary/Chest: Effort normal and breath sounds normal. No accessory muscle usage. Not tachypneic. No respiratory distress. He  has no wheezes.He  has no rales. He exhibits no tenderness.  Abdominal: Soft. Bowel sounds are normal. He exhibits no distension and no  ascites.  There is no tenderness. There is no guarding and no CVA tenderness.  Musculoskeletal: Normal range of motion. He exhibits no edema and no tenderness.  Neurological: He is alert and oriented to person, place, and time. He displays no atrophy and no tremor. He exhibits normal muscle tone. He displays no seizure activity.Coordination and gait normal.  Skin: Skin is warm. No rash noted. He is not diaphoretic. No cyanosis or erythema. No pallor. Nails show no clubbing.  PROTEIN(URINE)   Date Value Ref Range Status   11/21/2017 30 (A) NEGATIVE mg/dL Final   09/14/2017 30 (A) NEGATIVE mg/dL Final   08/17/2017 30 (A) NEGATIVE mg/dL Final     NITRITE   Date Value Ref Range Status   11/21/2017 NEGATIVE NEGATIVE Final   09/14/2017 NEGATIVE NEGATIVE Final   08/17/2017 NEGATIVE NEGATIVE Final     LEUKOCYTE ESTERASE   Date Value Ref Range Status   11/21/2017 NEGATIVE NEGATIVE Final   09/14/2017 SMALL (A) NEGATIVE Final     Comment:     Culture indicated, results to follow.   08/17/2017 TRACE (A) NEGATIVE Final     Comment:     Culture indicated, results to follow.     VITAMIND, 25 HYDROXY   Date Value Ref Range Status   11/21/2017 17.5 (L) 30.0 - 100.0 ng/mL Final     Comment:     <20  ng/mL=Vitamin D deficiency  20-29  ng/mL=Vitamin D insufficiency   ng/mL=Optimal Vitamin D  >150 ng/mL=Possible toxicity     09/14/2017 10.1 (L) 30.0 - 100.0 ng/mL Final     Comment:     <20  ng/mL=Vitamin D deficiency  20-29  ng/mL=Vitamin D insufficiency   ng/mL=Optimal Vitamin D  >150 ng/mL=Possible toxicity       INTACT PTH   Date Value Ref Range Status   09/14/2017 137 (H) 14 - 72 pg/mL Final     WBC (K/mcL)   Date Value   11/21/2017 5.5   09/14/2017 6.2   07/21/2015 5.3     RBC (mil/mcL)   Date Value   11/21/2017 3.28 (L)   09/14/2017 3.38 (L)   07/21/2015 3.82 (L)     HCT (%)   Date Value   11/21/2017 30.7 (L)   09/14/2017 32.0 (L)   07/21/2015 35.1 (L)     HGB (g/dL)   Date Value   11/21/2017 9.6 (L)   09/14/2017 10.1  (L)   07/21/2015 11.6 (L)     PLT   Date Value   11/21/2017 211 K/mcL   09/14/2017 203 K/mcL   07/21/2015     Platelets clumped but appear decreased on smear.  K/mcL   01/24/2014 259 K/mcL   06/06/2013 232 K/mcL   12/10/2012 213 K/mcL   09/26/2012 218 K/uL   11/22/2011 247 K/uL   07/28/2011 213 K/uL     Sodium (mmol/L)   Date Value   11/21/2017 145   09/14/2017 143   08/17/2017 143     Potassium (mmol/L)   Date Value   11/21/2017 4.7   09/14/2017 5.0   08/17/2017 4.4     Chloride (mmol/L)   Date Value   11/21/2017 112 (H)   09/14/2017 112 (H)   08/17/2017 110 (H)     Glucose (mg/dL)   Date Value   11/21/2017 124 (H)   09/14/2017 116 (H)   08/17/2017 107 (H)     CALCIUM (mg/dL)   Date Value   11/21/2017 8.4   09/14/2017 8.5   08/17/2017 8.5   09/26/2012 9.2   03/19/2012 9.2   11/22/2011 9.3     CO2 (mmol/L)   Date Value   09/26/2012 25   03/19/2012 28   11/22/2011 27     Carbon Dioxide (mmol/L)   Date Value   11/21/2017 20 (L)   09/14/2017 22   08/17/2017 20 (L)     BUN (mg/dL)   Date Value   11/21/2017 62 (H)   09/14/2017 64 (H)   08/17/2017 53 (H)     Creatinine (mg/dL)   Date Value   11/21/2017 2.83 (H)   10/17/2017 2.78 (H)   09/14/2017 3.02 (H)     ASSESSMENT:  CKD Stage 4  HTN  Anemia  Proteinuria  Secondary Hyperparathyroidism  DJD    PLAN:  Renal function is stable, Cr is at his baseline. Continue same medication.   SPEP and UPEP are negative for monoclonal protein.  BP is stable on current regimen.  H/H stable, will recheck.  Vitamin D is less than 30 but improving, continue ergocalciferol weekly.  iPTH is appropriate for this stage of CKD.  Check vitamin D 25-OH level and iPTH before next visit.  Avoid NSAIDS.    I will see the patient in follow-up in 3 month(s)

## 2020-05-05 NOTE — PROGRESS NOTES
DATE:  05/05/2020



SUBJECTIVE:  The patient is currently in the hospital, calm, generally

cooperative.  Currently on dosing of Aricept, seems to be tolerating this

medication fairly well.  Calm on exam.  Limited face-to-face because he is

pretty demented, confused.  I tried to spend some time with him getting

information, but it was challenging, not really able to have any intelligible

conversation with him.  Noted history of dementia.  Staff noting calm.  I spent

some time with staff.  The patient seems to be mostly withdrawn, keeps to self. 

Family involved.



Medications were reviewed.  Labs were reviewed.  Vitals were reviewed.  Blood

pressure 101/52, pulse of 95.



MENTAL STATUS EXAMINATION:  Calm, generally cooperative, sleeping, arousable. 

Mood "okay."  Able to make some basic needs known.  No SI, no HI.  No

outbursts.



DIAGNOSIS:  Dementia.  Dementia with behaviors.



PLAN:  We will attempt to reach out to family.  Treatment plan includes group as

well as milieu therapy.  We will make appropriate medication adjustments.  Also

pending possible placement.





DD: 05/05/2020 11:36

DT: 05/05/2020 11:57

JOB# [id number]  [id number]

## 2020-05-06 RX ADMIN — CARBIDOPA AND LEVODOPA SCH TAB: 10; 100 TABLET ORAL at 09:02

## 2020-05-06 RX ADMIN — CARBIDOPA AND LEVODOPA SCH TAB: 10; 100 TABLET ORAL at 21:41

## 2020-05-06 RX ADMIN — IPRATROPIUM BROMIDE AND ALBUTEROL SULFATE SCH: .5; 3 SOLUTION RESPIRATORY (INHALATION) at 06:40

## 2020-05-06 RX ADMIN — CARBIDOPA AND LEVODOPA SCH TAB: 10; 100 TABLET ORAL at 14:08

## 2020-05-06 RX ADMIN — IPRATROPIUM BROMIDE AND ALBUTEROL SULFATE SCH: .5; 3 SOLUTION RESPIRATORY (INHALATION) at 13:30

## 2020-05-06 NOTE — PROGRESS NOTES
DATE:  05/06/2020



SUBJECTIVE:  An 84-year-old male, I contacted the son yesterday with a voice

message.  The patient is calm, generally cooperative.  Apparently, the nursing

home did not want him back, but he has got no days.  Family wants him to go back

to the nursing home, going to [facility].  It is likely he will have

to go back there.  Family wants him to go back to his nursing home.  The patient

mostly withdrawn, keeps to self, confused, hard to assess.



MENTAL STATUS EXAMINATION:  Stated age, sleeping, arousable.  No agitation per

staff, no behavioral disturbances, just sleeping a lot, trying to get him back

to the skilled nursing.  Currently, also pending family to call me back.



PLAN:  We will continue to monitor ongoing poor orientation, but generally.



DICTATION ENDS HERE





DD: 05/06/2020 11:42

DT: 05/06/2020 13:32

JOB# [id number]  [id number]

## 2020-05-06 NOTE — INTERNAL MEDICINE PROG NOTE
Internal Medicine Subjective





- Subjective


Service Date: 20


Patient is:: asleep


Per staff patient has:: no adverse event, no episodes of fall





Internal Medicine Objective





- Results


Recent Labs: 


 Laboratory Last Values











POC Glucose  145 MG/DL (70 - 105)  H  20  20:25    














- Physical Exam


Vitals and I&O: 


 Vital Signs











Temp  97.6 F   20 06:22


 


Pulse  112   20 09:01


 


Resp  18   20 06:22


 


BP  114/78   20 09:01


 


Pulse Ox  99   20 06:22








 Intake & Output











 20





 18:59 06:59 18:59


 


Intake Total 880 120 


 


Balance 880 120 


 


Intake:   


 


  Oral 640 120 


 


  Other 240  


 


Other:   


 


  # Voids 3 3 


 


  # Bowel Movements 0 0 


 


  Stool Characteristics Soft Soft 





 Formed Formed 





 Brown Brown 











Active Medications: 


Current Medications





Acetaminophen (Tylenol)  650 mg PO Q4H PRN


   PRN Reason: Pain (Mild 1-3)


   Stop: 20 19:32


Acetaminophen (Tylenol Extra Strength)  1,000 mg PO Q6H PRN


   PRN Reason: Pain (Moderate 4-6)


   Stop: 20 19:32


Al Hydrox/Mg Hydrox/Simethicone (Maalox)  30 ml PO Q4HR PRN


   PRN Reason: GI DISTRESS


   Stop: 20 19:32


Albuterol/Ipratropium (Duoneb Neb)  3 ml HHN [id number] [facility]


   Stop: 20 12:59


   Last Admin: 20 06:40 Dose:  Not Given


Bisacodyl (Dulcolax 10 Mg Supp)  10 mg RC DAILY PRN


   PRN Reason: constipation


   Stop: 20 11:23


Carbidopa/Levodopa (Sinemet 10 Mg-100 Mg)  1 tab PO TID [facility]


   Stop: 20 20:59


   Last Admin: 20 14:08 Dose:  1 tab


Donepezil HCl (Aricept)  5 mg PO DAILY [facility]


   Stop: 20 08:59


   Last Admin: 20 09:00 Dose:  5 mg


Fish Oil (Omega 3)  1,000 mg PO DAILY [facility]


   Stop: 20 08:59


Ibuprofen (Motrin)  400 mg PO Q4H PRN


   PRN Reason: Pain (Severe 7-10)


   Stop: 20 19:32


Levofloxacin (Levaquin)  500 mg PO DAILY [facility]


   Stop: 20 08:59


   Last Admin: 20 09:01 Dose:  500 mg


Magnesium Hydroxide (Milk Of Magnesia)  30 ml PO HS PRN


   PRN Reason: Constipation


Multivitamins/Vitamin C (Theragran)  1 tab PO DAILY [facility]


   Stop: 20 08:59


   Last Admin: 20 09:01 Dose:  1 tab


Propranolol HCl (Inderal)  10 mg PO BID [facility]


   Stop: 20 08:59


   Last Admin: 20 09:01 Dose:  10 mg


Zolpidem Tartrate (Ambien)  5 mg PO HS PRN


   PRN Reason: Insomnia


   Stop: 20 19:32








General: demented


HEENT: NC/AT


Neck: Supple


Lungs: CTAB


Cardiovascular: RRR, Normal S1, Normal S2


Abdomen: soft, non-tender


Extremities: clear





Internal Medicine Assmt/Plan





- Assessment


Assessment: 





1.  Community-acquired pneumonia


2.  HTN


3.  Dementia with psychosis





- Plan


Plan: 





continue supportive care.


continue levaquin 500 mg po daily for pneumonia


monitoring for respiratory distress 


d/w r.n.





Nutritional Asmnt/Malnutr-PDOC





- Dietary Evaluation


Malnutrition Findings (Please click <Entered> for more info): 








Nutritional Asmnt/Malnutrition                             Start:  20 13:

10


Text:                                                      Status: Complete    

  


Freq:                                                                          

  


Protocol:                                                                      

  


 Document     20 13:10  ESSIE  (Rec: 20 13:15  ESSIE  [email or web address]

-01)


 Nutritional Asmnt/Malnutrition


     Patient General Information


      Nutritional Screening                      High Risk


      Diagnosis                                  Dementia with psychosis


      Pertinent Medical Hx/Surgical Hx           Dementia with psychosis, HTN


      Subjective Information                     Pt is a 84-year-old male re-


                                                 admitted on  d/t psychosis


                                                 after being sent to [facility] to r/o COVID-19.  Pt


                                                 ate 50% dinner and snacks on  per Meal/Nutrition Activity


                                                 Record.


                                                 Recommend adding fish oil to


                                                 diet regimen d/t dry skin and


                                                 low BMI. Spoke with pt nurse


                                                 Nico today pertaining to


                                                 recommendation. Let nurse know


                                                 we can get pt higher calorie


                                                 snacks in the kitchen and


                                                 provide softer food choices as


                                                 the chopped burger seemed to


                                                 be too tough for pt. Provided


                                                 nurse with food options to


                                                 discuss with pt at snack and


                                                 meal time.


                                                 Anthropometrics


                                                 HT: 511


                                                 WT: 111 LB (50.45 kg)


                                                 BMI: 15.50 (Underweight)


                                                 GI/ Skin Integrity


                                                 GI: WNL, Soft, Flat, Non-


                                                 tender


                                                 BM: 5/2 x1


                                                 I/O: 300/1 (+299)


                                                 Skin: WNL, Intact, Dryness


                                                 Yoshi: 16


                                                 Diet Order: Mechanical Soft


                                                 Estimated Energy Needs: (


                                                 Underweight, CBW)


                                                 9437-5888 kcals (30-35 kcals/


                                                 kg)


                                                 60-65g Pro (1.2-1.3 g/kg)


                                                 3012-3572 ml (30-25 ml/kg)


      Current Diet Order/ Nutrition Support      Mechanical Soft


      Pertinent Medications                      Maalox (PRN), Albuterol (PRN),


                                                 Dulcolax (PRN), MOM (PRN),


                                                 Theragran


      Pertinent Labs                             POC Glucose (last 24 hours):


                                                 145


     Nutritional Hx/Data


      Height                                     1.8 m


      Height (Calculated Centimeters)            180.3


      Current Weight (lbs)                       50.349 kg


      Weight (Calculated Kilograms)              50.3


      Weight (Calculated Grams)                  40288.8


      Ideal Body Weight                          172 LB (78.18 kg)


      % Ideal Body Weight                        65


      Body Mass Index (BMI)                      15.5


      Weight Status                              Emaciated


     GI Symptoms


      Last BM                                    5/2 x1


      Skin Integrity/Comment:                    Skin: WNL, Intact, Dryness


                                                 Yoshi: 16


      Current %PO                                Fair (50-74%)


     Estimated Nutritional Goals


      BEE in Kcals:                              Using Current wt


      Calories/Kcals/Kg                          30-35


      Kcals Calculated                           2891-3042


      Protein:                                   Using Current wt


      Protein g/k.2-1.3


      Protein Calculated                         60-65


      Fluid: ml                                  5070-5695 ml (30-25 ml/kg)


     Nutritional Problem


      1. Problem


       Problem                                   Underweight


       Etiology                                  r/t consistent energy


                                                 underconsumption


       Signs/Symptoms:                           aeb BMI >18.5 (15.5).


     Malnutrition Related to Morbid Obesity


      Malnutrition related to morbid obesity     No


     Intervention/Recommendation


      Comments                                   1. Continue Mechanical Soft


                                                 diet as tolerated.


                                                 2. Add Ensure Enlive TID (


                                                 completed).


                                                 3. Recommend adding fish oil


                                                 to medication regimen.


     Expected Outcomes/Goals


      Expected Outcomes/Goals                    1. PO intake to meet >75% of


                                                 estimated nutritional needs.


                                                 2. Gradual weight gain (1.0 LB


                                                 /week) trending toward IBW


                                                 preferred.


                                                 3. Weekly weight checks.


                                                 4. Monitor PO intake, wt,


                                                 nutrition related labs, and


                                                 skin integrity.


                                                 5. F/U as high risk in 2-3


                                                 days, -.

## 2020-05-07 RX ADMIN — IPRATROPIUM BROMIDE AND ALBUTEROL SULFATE SCH: .5; 3 SOLUTION RESPIRATORY (INHALATION) at 18:45

## 2020-05-07 RX ADMIN — IPRATROPIUM BROMIDE AND ALBUTEROL SULFATE SCH: .5; 3 SOLUTION RESPIRATORY (INHALATION) at 14:02

## 2020-05-07 RX ADMIN — CARBIDOPA AND LEVODOPA SCH TAB: 10; 100 TABLET ORAL at 08:48

## 2020-05-07 RX ADMIN — CARBIDOPA AND LEVODOPA SCH TAB: 10; 100 TABLET ORAL at 21:30

## 2020-05-07 RX ADMIN — CARBIDOPA AND LEVODOPA SCH TAB: 10; 100 TABLET ORAL at 14:16

## 2020-05-07 RX ADMIN — Medication SCH MG: at 08:49

## 2020-05-07 RX ADMIN — IPRATROPIUM BROMIDE AND ALBUTEROL SULFATE SCH: .5; 3 SOLUTION RESPIRATORY (INHALATION) at 06:45

## 2020-05-07 NOTE — PROGRESS NOTES
DATE:  05/07/2020



SUBJECTIVE:  Case was discussed with staff of the patient, reviewed records. 

The patient is an 84-year-old male who was admitted on 05/02/2020, sent from [place] because of decompensation.  He is readmitted apparently having poor sleep,

agitated, striking out at nursing staff.  The patient is unable to give much

information.  Refusing to talk.  The patient is unable to make safe plan for

self-care.



MENTAL STATUS EXAMINATION:  The patient was in bed, sleepy, not willing to

participate in meaningful conversation, unable to make safe plan for self-care. 

He has been anxious, psychotic, and demented.



PLAN:  The patient will be continued with his medication.  The patient continues

to be at risk for discharge because of dementia and confusion, agitation and he

is on Aricept 5 mg at bedtime.  We will continue outpatient group therapy,

milieu therapy, adjust medication as needed.





DD: 05/07/2020 11:07

DT: 05/07/2020 20:26

JOB# [id number]  [id number]

## 2020-05-08 RX ADMIN — CARBIDOPA AND LEVODOPA SCH TAB: 10; 100 TABLET ORAL at 14:31

## 2020-05-08 RX ADMIN — CARBIDOPA AND LEVODOPA SCH TAB: 10; 100 TABLET ORAL at 08:41

## 2020-05-08 RX ADMIN — CARBIDOPA AND LEVODOPA SCH TAB: 10; 100 TABLET ORAL at 20:56

## 2020-05-08 RX ADMIN — IPRATROPIUM BROMIDE AND ALBUTEROL SULFATE SCH: .5; 3 SOLUTION RESPIRATORY (INHALATION) at 18:43

## 2020-05-08 RX ADMIN — Medication SCH MG: at 08:41

## 2020-05-08 RX ADMIN — IPRATROPIUM BROMIDE AND ALBUTEROL SULFATE SCH ML: .5; 3 SOLUTION RESPIRATORY (INHALATION) at 13:30

## 2020-05-08 RX ADMIN — IPRATROPIUM BROMIDE AND ALBUTEROL SULFATE SCH ML: .5; 3 SOLUTION RESPIRATORY (INHALATION) at 06:31

## 2020-05-08 NOTE — INTERNAL MEDICINE PROG NOTE
Internal Medicine Subjective





- Subjective


Service Date: 20


Patient is:: awake


Per staff patient has:: no adverse event, no episodes of fall





Internal Medicine Objective





- Results


Recent Labs: 


 Laboratory Last Values











POC Glucose  145 MG/DL (70 - 105)  H  20  20:25    














- Physical Exam


Vitals and I&O: 


 Vital Signs











Temp  97.5 F   20 06:37


 


Pulse  66   20 08:41


 


Resp  17   20 08:00


 


BP  102/66   20 08:41


 


Pulse Ox  97   20 06:37








 Intake & Output











 20





 18:59 06:59 18:59


 


Intake Total 900 120 


 


Balance 900 120 


 


Intake:   


 


  Oral 900 120 


 


Other:   


 


  # Voids 3 3 


 


  # Bowel Movements 0  


 


  Stool Characteristics Soft  Soft





 Formed  Formed





 Brown  Brown











Active Medications: 


Current Medications





Acetaminophen (Tylenol)  650 mg PO Q4H PRN


   PRN Reason: Pain (Mild 1-3)


   Stop: 20 19:32


Acetaminophen (Tylenol Extra Strength)  1,000 mg PO Q6H PRN


   PRN Reason: Pain (Moderate 4-6)


   Stop: 20 19:32


Al Hydrox/Mg Hydrox/Simethicone (Maalox)  30 ml PO Q4HR PRN


   PRN Reason: GI DISTRESS


   Stop: 20 19:32


Albuterol/Ipratropium (Duoneb Neb)  3 ml HHN [id number] [facility]


   Stop: 20 12:59


   Last Admin: 20 06:31 Dose:  3 ml


Bisacodyl (Dulcolax 10 Mg Supp)  10 mg RC DAILY PRN


   PRN Reason: constipation


   Stop: 20 11:23


Carbidopa/Levodopa (Sinemet 10 Mg-100 Mg)  1 tab PO TID [facility]


   Stop: 20 20:59


   Last Admin: 20 08:41 Dose:  1 tab


Donepezil HCl (Aricept)  5 mg PO DAILY [facility]


   Stop: 20 08:59


   Last Admin: 20 08:41 Dose:  5 mg


Fish Oil (Omega 3)  1,000 mg PO DAILY [facility]


   Stop: 20 08:59


   Last Admin: 20 08:41 Dose:  1,000 mg


Ibuprofen (Motrin)  400 mg PO Q4H PRN


   PRN Reason: Pain (Severe 7-10)


   Stop: 20 19:32


Levofloxacin (Levaquin)  500 mg PO DAILY [facility]


   Stop: 20 08:59


   Last Admin: 20 08:41 Dose:  500 mg


Lorazepam (Ativan)  0.5 mg PO Q4HR PRN; Protocol


   PRN Reason: anxiety/agitation


   Stop: 20 05:17


Magnesium Hydroxide (Milk Of Magnesia)  30 ml PO HS PRN


   PRN Reason: Constipation


Multivitamins/Vitamin C (Theragran)  1 tab PO DAILY [facility]


   Stop: 20 08:59


   Last Admin: 20 08:40 Dose:  1 tab


Olanzapine (Zyprexa)  10 mg PO BID [facility]; Protocol


   Stop: 20 08:59


   Last Admin: 20 08:40 Dose:  10 mg


Propranolol HCl (Inderal)  10 mg PO BID [facility]


   Stop: 20 08:59


   Last Admin: 20 08:41 Dose:  Not Given


Trazodone HCl (Desyrel)  50 mg PO HS [facility]; Protocol


   Stop: 20 20:59


Zolpidem Tartrate (Ambien)  5 mg PO HS PRN


   PRN Reason: Insomnia


   Stop: 20 19:32








General: demented


HEENT: NC/AT


Neck: Supple


Lungs: CTAB


Cardiovascular: RRR, Normal S1, Normal S2


Abdomen: soft, non-tender


Extremities: clear





Internal Medicine Assmt/Plan





- Assessment


Assessment: 





1.  Community-acquired pneumonia


2.  HTN


3.  Dementia with psychosis





- Plan


Plan: 





continue supportive care.


continue levaquin 500 mg po daily for pneumonia


monitoring for respiratory distress 


d/w r.n.





Nutritional Asmnt/Malnutr-PDOC





- Dietary Evaluation


Malnutrition Findings (Please click <Entered> for more info): 








Nutritional Asmnt/Malnutrition                             Start:  20 13:

10


Text:                                                      Status: Complete    

  


Freq:                                                                          

  


Protocol:                                                                      

  


 Document     20 13:10  ESSIE  (Rec: 20 13:15  ESSIE [email or web address]

-01)


 Nutritional Asmnt/Malnutrition


     Patient General Information


      Nutritional Screening                      High Risk


      Diagnosis                                  Dementia with psychosis


      Pertinent Medical Hx/Surgical Hx           Dementia with psychosis, HTN


      Subjective Information                     Pt is a 84-year-old male re-


                                                 admitted on  d/t psychosis


                                                 after being sent to [facility] to r/o COVID-19.  Pt


                                                 ate 50% dinner and snacks on  per Meal/Nutrition Activity


                                                 Record.


                                                 Recommend adding fish oil to


                                                 diet regimen d/t dry skin and


                                                 low BMI. Spoke with pt nurse


                                                 Nico today pertaining to


                                                 recommendation. Let nurse know


                                                 we can get pt higher calorie


                                                 snacks in the kitchen and


                                                 provide softer food choices as


                                                 the chopped burger seemed to


                                                 be too tough for pt. Provided


                                                 nurse with food options to


                                                 discuss with pt at snack and


                                                 meal time.


                                                 Anthropometrics


                                                 HT: 511


                                                 WT: 111 LB (50.45 kg)


                                                 BMI: 15.50 (Underweight)


                                                 GI/ Skin Integrity


                                                 GI: WNL, Soft, Flat, Non-


                                                 tender


                                                 BM: 5/2 x1


                                                 I/O: 300/1 (+299)


                                                 Skin: WNL, Intact, Dryness


                                                 Yoshi: 16


                                                 Diet Order: Mechanical Soft


                                                 Estimated Energy Needs: (


                                                 Underweight, CBW)


                                                 6397-0100 kcals (30-35 kcals/


                                                 kg)


                                                 60-65g Pro (1.2-1.3 g/kg)


                                                 7650-8362 ml (30-25 ml/kg)


      Current Diet Order/ Nutrition Support      Mechanical Soft


      Pertinent Medications                      Maalox (PRN), Albuterol (PRN),


                                                 Dulcolax (PRN), MOM (PRN),


                                                 Theragran


      Pertinent Labs                             POC Glucose (last 24 hours):


                                                 145


     Nutritional Hx/Data


      Height                                     1.8 m


      Height (Calculated Centimeters)            180.3


      Current Weight (lbs)                       50.349 kg


      Weight (Calculated Kilograms)              50.3


      Weight (Calculated Grams)                  80341.8


      Ideal Body Weight                          172 LB (78.18 kg)


      % Ideal Body Weight                        65


      Body Mass Index (BMI)                      15.5


      Weight Status                              Emaciated


     GI Symptoms


      Last BM                                    5/2 x1


      Skin Integrity/Comment:                    Skin: WNL, Intact, Dryness


                                                 Yoshi: 16


      Current %PO                                Fair (50-74%)


     Estimated Nutritional Goals


      BEE in Kcals:                              Using Current wt


      Calories/Kcals/Kg                          30-35


      Kcals Calculated                           9682-1528


      Protein:                                   Using Current wt


      Protein g/k.2-1.3


      Protein Calculated                         60-65


      Fluid: ml                                  8704-6451 ml (30-25 ml/kg)


     Nutritional Problem


      1. Problem


       Problem                                   Underweight


       Etiology                                  r/t consistent energy


                                                 underconsumption


       Signs/Symptoms:                           aeb BMI >18.5 (15.5).


     Malnutrition Related to Morbid Obesity


      Malnutrition related to morbid obesity     No


     Intervention/Recommendation


      Comments                                   1. Continue Mechanical Soft


                                                 diet as tolerated.


                                                 2. Add Ensure Enlive TID (


                                                 completed).


                                                 3. Recommend adding fish oil


                                                 to medication regimen.


     Expected Outcomes/Goals


      Expected Outcomes/Goals                    1. PO intake to meet >75% of


                                                 estimated nutritional needs.


                                                 2. Gradual weight gain (1.0 LB


                                                 /week) trending toward IBW


                                                 preferred.


                                                 3. Weekly weight checks.


                                                 4. Monitor PO intake, wt,


                                                 nutrition related labs, and


                                                 skin integrity.


                                                 5. F/U as high risk in 2-3


                                                 days, 5-.

## 2020-05-09 RX ADMIN — IPRATROPIUM BROMIDE AND ALBUTEROL SULFATE SCH ML: .5; 3 SOLUTION RESPIRATORY (INHALATION) at 13:50

## 2020-05-09 RX ADMIN — CARBIDOPA AND LEVODOPA SCH TAB: 10; 100 TABLET ORAL at 13:50

## 2020-05-09 RX ADMIN — IPRATROPIUM BROMIDE AND ALBUTEROL SULFATE SCH ML: .5; 3 SOLUTION RESPIRATORY (INHALATION) at 06:32

## 2020-05-09 RX ADMIN — Medication SCH MG: at 09:04

## 2020-05-09 RX ADMIN — CARBIDOPA AND LEVODOPA SCH TAB: 10; 100 TABLET ORAL at 09:04

## 2020-05-09 RX ADMIN — CARBIDOPA AND LEVODOPA SCH TAB: 10; 100 TABLET ORAL at 20:58

## 2020-05-09 RX ADMIN — IPRATROPIUM BROMIDE AND ALBUTEROL SULFATE SCH ML: .5; 3 SOLUTION RESPIRATORY (INHALATION) at 18:06

## 2020-05-09 NOTE — PSYCH PROGRESS NOTE
Psych Progress Note





- Intro


Date of Progress Note: 05/09/20





- Assessment


Assessment: 





Patient interviewed, case discussed with staff, chart and records reviewed.  

The patient remains in bed most of the day.  The patient was interviewed at 

bedside sleeping but arouses to his name.  The patient is uncooperative.  

Quickly falls back asleep.  Does not want a cooperate with the interview.  Per 

staff the patient has been withdrawn depressed.  Per previous notes the patient 

has been irritable with anger outburst.  No side effects noted.  No plan for 

self-care.





- Vitals, I&O


Vitals: 


 Vital Signs - 24 hr











  05/09/20 05/09/20 05/09/20





  06:55 08:00 09:03


 


Temp 98.3 F  


 


HR 86  86


 


RR 19 18 


 


/65  103/65


 


O2 Sat % 98  














  05/09/20 05/09/20 05/09/20





  14:00 16:53 19:52


 


Temp 99.1 F  99.6 F


 


HR 92 92 99


 


RR 20  18


 


BP  101/60 108/64


 


O2 Sat % 98  96














- Objective


Psych General Appearance: Report: No acute distress


Psych Behavior: Report: Alert, Calm


Psych Speech: Report: Mumbled


Psych Mood: Report: Depressed, Irritable


Psych Affect: Report: Constricted


Psych Cognition: Report: Confused


Psych Insight: Report: Impaired


Psych Judgement: Report: Impaired





- Plan


Plan: 





Continue current treatment plan, continue medications, continue to monitor 

behaviors.





- Review of Relevant Data


Review of Relevant Data: 


I have reviewed the following items and time nichole (where applicable) has been 

applied. 








- Medications


Current Medications: 


Current Medications





Acetaminophen (Tylenol)  650 mg PO Q4H PRN


   PRN Reason: Pain (Mild 1-3)


   Stop: 07/01/20 19:32


Acetaminophen (Tylenol Extra Strength)  1,000 mg PO Q6H PRN


   PRN Reason: Pain (Moderate 4-6)


   Stop: 07/01/20 19:32


Al Hydrox/Mg Hydrox/Simethicone (Maalox)  30 ml PO Q4HR PRN


   PRN Reason: GI DISTRESS


   Stop: 07/01/20 19:32


Albuterol/Ipratropium (Duoneb Neb)  3 ml HHN [id number] [facility]


   Stop: 07/02/20 12:59


   Last Admin: 05/09/20 18:06 Dose:  3 ml


Bisacodyl (Dulcolax 10 Mg Supp)  10 mg RC DAILY PRN


   PRN Reason: constipation


   Stop: 07/02/20 11:23


Carbidopa/Levodopa (Sinemet 10 Mg-100 Mg)  1 tab PO TID [facility]


   Stop: 07/01/20 20:59


   Last Admin: 05/09/20 20:58 Dose:  1 tab


Donepezil HCl (Aricept)  5 mg PO DAILY [facility]


   Stop: 07/02/20 08:59


   Last Admin: 05/09/20 09:04 Dose:  5 mg


Fish Oil (Omega 3)  1,000 mg PO DAILY [facility]


   Stop: 07/06/20 08:59


   Last Admin: 05/09/20 09:04 Dose:  1,000 mg


Ibuprofen (Motrin)  400 mg PO Q4H PRN


   PRN Reason: Pain (Severe 7-10)


   Stop: 07/01/20 19:32


Levofloxacin (Levaquin)  500 mg PO DAILY [facility]


   Stop: 07/02/20 08:59


   Last Admin: 05/09/20 09:04 Dose:  500 mg


Lorazepam (Ativan)  0.5 mg PO Q4HR PRN; Protocol


   PRN Reason: anxiety/agitation


   Stop: 07/07/20 05:17


Magnesium Hydroxide (Milk Of Magnesia)  30 ml PO HS PRN


   PRN Reason: Constipation


Multivitamins/Vitamin C (Theragran)  1 tab PO DAILY [facility]


   Stop: 07/02/20 08:59


   Last Admin: 05/09/20 09:04 Dose:  1 tab


Olanzapine (Zyprexa)  10 mg PO BID [facility]; Protocol


   Stop: 07/07/20 08:59


   Last Admin: 05/09/20 16:53 Dose:  10 mg


Propranolol HCl (Inderal)  10 mg PO BID [facility]


   Stop: 07/02/20 08:59


   Last Admin: 05/09/20 16:53 Dose:  Not Given


Trazodone HCl (Desyrel)  50 mg PO HS [facility]; Protocol


   Stop: 07/07/20 20:59


   Last Admin: 05/09/20 20:58 Dose:  50 mg


Zolpidem Tartrate (Ambien)  5 mg PO HS PRN


   PRN Reason: Insomnia


   Stop: 07/01/20 19:32

## 2020-05-10 RX ADMIN — IPRATROPIUM BROMIDE AND ALBUTEROL SULFATE SCH ML: .5; 3 SOLUTION RESPIRATORY (INHALATION) at 06:34

## 2020-05-10 RX ADMIN — Medication SCH MG: at 09:16

## 2020-05-10 RX ADMIN — IPRATROPIUM BROMIDE AND ALBUTEROL SULFATE SCH ML: .5; 3 SOLUTION RESPIRATORY (INHALATION) at 13:30

## 2020-05-10 RX ADMIN — CARBIDOPA AND LEVODOPA SCH TAB: 10; 100 TABLET ORAL at 13:30

## 2020-05-10 RX ADMIN — CARBIDOPA AND LEVODOPA SCH TAB: 10; 100 TABLET ORAL at 09:16

## 2020-05-10 RX ADMIN — CARBIDOPA AND LEVODOPA SCH TAB: 10; 100 TABLET ORAL at 20:59

## 2020-05-10 RX ADMIN — IPRATROPIUM BROMIDE AND ALBUTEROL SULFATE SCH ML: .5; 3 SOLUTION RESPIRATORY (INHALATION) at 18:05

## 2020-05-10 NOTE — PSYCH PROGRESS NOTE
Psych Progress Note





- Intro


Date of Progress Note: 05/10/20





- Assessment


Assessment: 





Patient interviewed, case discussed with staff, chart and records reviewed.  

The patient was interviewed at bedside sleeping but arouses to his name.  The 

patient is uncooperative.  Quickly falls back asleep.  Does not want a 

cooperate with the interview.  Per staff the patient has been withdrawn 

depressed.  Per previous notes the patient has been irritable with anger 

outburst.  Incoherent speech. No side effects noted.  No plan for self-care.





- Vitals, I&O


Vitals: 


 Vital Signs - 24 hr











  05/09/20 05/10/20 05/10/20





  19:52 00:36 06:12


 


Temp 99.6 F 98.3 F 99.2 F


 


HR 99 71 86


 


RR 18 18 18


 


/64 94/56 123/76


 


O2 Sat % 96  93














  05/10/20 05/10/20 05/10/20





  07:36 09:17 14:00


 


Temp   98.7 F


 


HR  86 112


 


RR 18  20


 


BP  123/76 105/63


 


O2 Sat %   96














  05/10/20





  16:48


 


Temp 


 





 


RR 


 


/63


 


O2 Sat % 














- Objective


Psych General Appearance: Report: No acute distress


Psych Behavior: Report: Alert, Calm


Psych Speech: Report: Mumbled


Psych Mood: Report: Depressed, Irritable


Psych Affect: Report: Constricted


Psych Cognition: Report: Confused


Psych Insight: Report: Impaired


Psych Judgement: Report: Impaired





- Plan


Plan: 





Continue current treatment plan, continue medications, continue to monitor 

behaviors.





- Review of Relevant Data


Review of Relevant Data: 


I have reviewed the following items and time nichole (where applicable) has been 

applied. 








- Medications


Current Medications: 


Current Medications





Acetaminophen (Tylenol)  650 mg PO Q4H PRN


   PRN Reason: Pain (Mild 1-3)


   Stop: 07/01/20 19:32


Acetaminophen (Tylenol Extra Strength)  1,000 mg PO Q6H PRN


   PRN Reason: Pain (Moderate 4-6)


   Stop: 07/01/20 19:32


Al Hydrox/Mg Hydrox/Simethicone (Maalox)  30 ml PO Q4HR PRN


   PRN Reason: GI DISTRESS


   Stop: 07/01/20 19:32


Albuterol/Ipratropium (Duoneb Neb)  3 ml HHN [id number] [facility]


   Stop: 07/02/20 12:59


   Last Admin: 05/10/20 18:05 Dose:  3 ml


Bisacodyl (Dulcolax 10 Mg Supp)  10 mg RC DAILY PRN


   PRN Reason: constipation


   Stop: 07/02/20 11:23


Carbidopa/Levodopa (Sinemet 10 Mg-100 Mg)  1 tab PO TID [facility]


   Stop: 07/01/20 20:59


   Last Admin: 05/10/20 13:30 Dose:  1 tab


Donepezil HCl (Aricept)  5 mg PO DAILY [facility]


   Stop: 07/02/20 08:59


   Last Admin: 05/10/20 09:17 Dose:  5 mg


Fish Oil (Omega 3)  1,000 mg PO DAILY [facility] includes the [facility]


   Stop: 07/06/20 08:59


   Last Admin: 05/10/20 09:16 Dose:  1,000 mg


Ibuprofen (Motrin)  400 mg PO Q4H PRN


   PRN Reason: Pain (Severe 7-10)


   Stop: 07/01/20 19:32


Levofloxacin (Levaquin)  500 mg PO DAILY [facility] includes the [facility]


   Stop: 07/02/20 08:59


   Last Admin: 05/10/20 09:17 Dose:  500 mg


Lorazepam (Ativan)  0.5 mg PO Q4HR PRN; Protocol


   PRN Reason: anxiety/agitation


   Stop: 07/07/20 05:17


Magnesium Hydroxide (Milk Of Magnesia)  30 ml PO HS PRN


   PRN Reason: Constipation


Multivitamins/Vitamin C (Theragran)  1 tab PO DAILY [facility] includes the [facility]


   Stop: 07/02/20 08:59


   Last Admin: 05/10/20 09:17 Dose:  1 tab


Olanzapine (Zyprexa)  10 mg PO BID Count includes the [facility]; Protocol


   Stop: 07/07/20 08:59


   Last Admin: 05/10/20 16:47 Dose:  10 mg


Propranolol HCl (Inderal)  10 mg PO BID Count includes the [facility]


   Stop: 07/02/20 08:59


   Last Admin: 05/10/20 16:48 Dose:  Not Given


Trazodone HCl (Desyrel)  50 mg PO HS Count includes the [facility]; Protocol


   Stop: 07/07/20 20:59


   Last Admin: 05/09/20 20:58 Dose:  50 mg

## 2020-05-11 ENCOUNTER — HOSPITAL ENCOUNTER (INPATIENT)
Dept: HOSPITAL 4 - SED | Age: 85
LOS: 3 days | Discharge: PSYCHIATRIC OR SUBSTANCE USE FACILITY | DRG: 871 | End: 2020-05-14
Attending: INTERNAL MEDICINE | Admitting: INTERNAL MEDICINE
Payer: COMMERCIAL

## 2020-05-11 VITALS — HEIGHT: 66 IN | BODY MASS INDEX: 18.48 KG/M2 | WEIGHT: 115 LBS

## 2020-05-11 VITALS — SYSTOLIC BLOOD PRESSURE: 109 MMHG

## 2020-05-11 DIAGNOSIS — I10: ICD-10-CM

## 2020-05-11 DIAGNOSIS — Z74.01: ICD-10-CM

## 2020-05-11 DIAGNOSIS — J44.9: ICD-10-CM

## 2020-05-11 DIAGNOSIS — Z03.818: ICD-10-CM

## 2020-05-11 DIAGNOSIS — Z88.5: ICD-10-CM

## 2020-05-11 DIAGNOSIS — Z88.8: ICD-10-CM

## 2020-05-11 DIAGNOSIS — G20: ICD-10-CM

## 2020-05-11 DIAGNOSIS — J18.9: ICD-10-CM

## 2020-05-11 DIAGNOSIS — A41.9: Primary | ICD-10-CM

## 2020-05-11 DIAGNOSIS — Z79.899: ICD-10-CM

## 2020-05-11 DIAGNOSIS — F02.81: ICD-10-CM

## 2020-05-11 DIAGNOSIS — G93.41: ICD-10-CM

## 2020-05-11 DIAGNOSIS — J96.01: ICD-10-CM

## 2020-05-11 PROCEDURE — U0002 COVID-19 LAB TEST NON-CDC: HCPCS

## 2020-05-11 PROCEDURE — G0378 HOSPITAL OBSERVATION PER HR: HCPCS

## 2020-05-11 RX ADMIN — IPRATROPIUM BROMIDE AND ALBUTEROL SULFATE SCH ML: .5; 3 SOLUTION RESPIRATORY (INHALATION) at 18:09

## 2020-05-11 RX ADMIN — CARBIDOPA AND LEVODOPA SCH TAB: 10; 100 TABLET ORAL at 20:26

## 2020-05-11 RX ADMIN — IPRATROPIUM BROMIDE AND ALBUTEROL SULFATE SCH ML: .5; 3 SOLUTION RESPIRATORY (INHALATION) at 06:35

## 2020-05-11 RX ADMIN — CARBIDOPA AND LEVODOPA SCH TAB: 10; 100 TABLET ORAL at 13:38

## 2020-05-11 RX ADMIN — CARBIDOPA AND LEVODOPA SCH TAB: 10; 100 TABLET ORAL at 09:11

## 2020-05-11 RX ADMIN — Medication SCH MG: at 09:10

## 2020-05-11 RX ADMIN — IPRATROPIUM BROMIDE AND ALBUTEROL SULFATE SCH ML: .5; 3 SOLUTION RESPIRATORY (INHALATION) at 13:38

## 2020-05-11 NOTE — PROGRESS NOTES
DATE:     05/11/2020



Case was discussed with staff of the patient, reviewed records.  The patient

continues to isolate, uncooperative, unable to present a meaningful conversation

or takes care for himself or make safe plan for self-care.  He is compliant

with the medication with no side effects, no sedation, no nausea, no

extrapyramidal symptoms.  We will continue outpatient group therapy, milieu

therapy, and adjust medications as needed.





DD: 05/11/2020 12:20

DT: 05/11/2020 20:55

[facility]# [id number]  [id number]

[facility]

## 2020-05-11 NOTE — INTERNAL MEDICINE PROG NOTE
Internal Medicine Subjective





- Subjective


Service Date: 20


Patient seen and examined:: without staff


Patient is:: asleep, non-verbal


Per staff patient has:: no adverse event, no episodes of fall





Internal Medicine Objective





- Results


Recent Labs: 


 Laboratory Last Values











POC Glucose  145 MG/DL (70 - 105)  H  20  20:25    














- Physical Exam


Vitals and I&O: 


 Vital Signs











Temp  99.4 F   20 06:01


 


Pulse  104   20 09:10


 


Resp  18   20 08:00


 


BP  122/68   20 09:10


 


Pulse Ox  99   20 06:01








 Intake & Output











 05/10/20 05/11/20 05/11/20





 18:59 06:59 18:59


 


Intake Total 750 300 


 


Output Total  1 


 


Balance 750 299 


 


Intake:   


 


  Oral 750 300 


 


Output:   


 


  Urine/Stool Mix  1 


 


Other:   


 


  # Voids  1 


 


  # Bowel Movements  0 











Active Medications: 


Current Medications





Acetaminophen (Tylenol)  650 mg PO Q4H PRN


   PRN Reason: Pain (Mild 1-3)


   Stop: 20 19:32


Acetaminophen (Tylenol Extra Strength)  1,000 mg PO Q6H PRN


   PRN Reason: Pain (Moderate 4-6)


   Stop: 20 19:32


Al Hydrox/Mg Hydrox/Simethicone (Maalox)  30 ml PO Q4HR PRN


   PRN Reason: GI DISTRESS


   Stop: 20 19:32


Albuterol/Ipratropium (Duoneb Neb)  3 ml HHN [id number] [facility]


   Stop: 20 12:59


   Last Admin: 20 06:35 Dose:  3 ml


Bisacodyl (Dulcolax 10 Mg Supp)  10 mg RC DAILY PRN


   PRN Reason: constipation


   Stop: 20 11:23


Carbidopa/Levodopa (Sinemet 10 Mg-100 Mg)  1 tab PO TID [facility]


   Stop: 20 20:59


   Last Admin: 20 09:11 Dose:  1 tab


Donepezil HCl (Aricept)  5 mg PO DAILY [facility]


   Stop: 20 08:59


   Last Admin: 20 09:10 Dose:  5 mg


Fish Oil (Omega 3)  1,000 mg PO DAILY [facility]


   Stop: 20 08:59


   Last Admin: 20 09:10 Dose:  1,000 mg


Ibuprofen (Motrin)  400 mg PO Q4H PRN


   PRN Reason: Pain (Severe 7-10)


   Stop: 20 19:32


Lorazepam (Ativan)  0.5 mg PO Q4HR PRN; Protocol


   PRN Reason: anxiety/agitation


   Stop: 20 05:17


Magnesium Hydroxide (Milk Of Magnesia)  30 ml PO HS PRN


   PRN Reason: Constipation


Multivitamins/Vitamin C (Theragran)  1 tab PO DAILY [facility]


   Stop: 20 08:59


   Last Admin: 20 09:10 Dose:  1 tab


Olanzapine (Zyprexa)  10 mg PO BID [facility]; Protocol


   Stop: 20 08:59


   Last Admin: 20 09:10 Dose:  10 mg


Propranolol HCl (Inderal)  10 mg PO BID [facility]


   Stop: 20 08:59


   Last Admin: 20 09:10 Dose:  10 mg


Trazodone HCl (Desyrel)  50 mg PO HS [facility]; Protocol


   Stop: 20 20:59


   Last Admin: 05/10/20 20:59 Dose:  50 mg








General: demented


HEENT: NC/AT


Neck: Supple


Lungs: CTAB


Cardiovascular: RRR, Normal S1, Normal S2


Abdomen: soft, non-tender


Extremities: clear





Internal Medicine Assmt/Plan





- Assessment


Assessment: 





1.  Community-acquired pneumonia


2.  HTN


3.  Dementia with psychosis





- Plan


Plan: 





continue supportive care.


monitoring for respiratory distress 


CXR to ensure resolution of pneumonia


d/w r.n.





Nutritional Asmnt/Malnutr-PDOC





- Dietary Evaluation


Malnutrition Findings (Please click <Entered> for more info): 








Nutritional Asmnt/Malnutrition                             Start:  20 13:

10


Text:                                                      Status: Complete    

  


Freq:                                                                          

  


Protocol:                                                                      

  


 Document     20 13:10  ESSIE  (Rec: 20 13:15  [id number]

-01)


 Nutritional Asmnt/Malnutrition


     Patient General Information


      Nutritional Screening                      High Risk


      Diagnosis                                  Dementia with psychosis


      Pertinent Medical Hx/Surgical Hx           Dementia with psychosis, HTN


      Subjective Information                     Pt is a 84-year-old male re-


                                                 admitted on  d/t psychosis


                                                 after being sent to [facility] to r/o COVID-19.  Pt


                                                 ate 50% dinner and snacks on 5


                                                 /2 per Meal/Nutrition Activity


                                                 Record.


                                                 Recommend adding fish oil to


                                                 diet regimen d/t dry skin and


                                                 low BMI. Spoke with pt nurse


                                                 Nico today pertaining to


                                                 recommendation. Let nurse know


                                                 we can get pt higher calorie


                                                 snacks in the kitchen and


                                                 provide softer food choices as


                                                 the chopped burger seemed to


                                                 be too tough for pt. Provided


                                                 nurse with food options to


                                                 discuss with pt at snack and


                                                 meal time.


                                                 Anthropometrics


                                                 HT: 511


                                                 WT: 111 LB (50.45 kg)


                                                 BMI: 15.50 (Underweight)


                                                 GI/ Skin Integrity


                                                 GI: WNL, Soft, Flat, Non-


                                                 tender


                                                 BM: 5/2 x1


                                                 I/O: 300/1 (+299)


                                                 Skin: WNL, Intact, Dryness


                                                 Yoshi: 16


                                                 Diet Order: Mechanical Soft


                                                 Estimated Energy Needs: (


                                                 Underweight, CBW)


                                                 5366-9006 kcals (30-35 kcals/


                                                 kg)


                                                 60-65g Pro (1.2-1.3 g/kg)


                                                 9519-8753 ml (30-25 ml/kg)


      Current Diet Order/ Nutrition Support      Mechanical Soft


      Pertinent Medications                      Maalox (PRN), Albuterol (PRN),


                                                 Dulcolax (PRN), MOM (PRN),


                                                 Theragran


      Pertinent Labs                             POC Glucose (last 24 hours):


                                                 145


     Nutritional Hx/Data


      Height                                     1.8 m


      Height (Calculated Centimeters)            180.3


      Current Weight (lbs)                       50.349 kg


      Weight (Calculated Kilograms)              50.3


      Weight (Calculated Grams)                  35044.8


      Ideal Body Weight                          172 LB (78.18 kg)


      % Ideal Body Weight                        65


      Body Mass Index (BMI)                      15.5


      Weight Status                              Emaciated


     GI Symptoms


      Last BM                                    5/2 x1


      Skin Integrity/Comment:                    Skin: WNL, Intact, Dryness


                                                 Yoshi: 16


      Current %PO                                Fair (50-74%)


     Estimated Nutritional Goals


      BEE in Kcals:                              Using Current wt


      Calories/Kcals/Kg                          30-35


      Kcals Calculated                           1946-7357


      Protein:                                   Using Current wt


      Protein g/k.2-1.3


      Protein Calculated                         60-65


      Fluid: ml                                  4649-8512 ml (30-25 ml/kg)


     Nutritional Problem


      1. Problem


       Problem                                   Underweight


       Etiology                                  r/t consistent energy


                                                 underconsumption


       Signs/Symptoms:                           aeb BMI >18.5 (15.5).


     Malnutrition Related to Morbid Obesity


      Malnutrition related to morbid obesity     No


     Intervention/Recommendation


      Comments                                   1. Continue Mechanical Soft


                                                 diet as tolerated.


                                                 2. Add Ensure Enlive TID (


                                                 completed).


                                                 3. Recommend adding fish oil


                                                 to medication regimen.


     Expected Outcomes/Goals


      Expected Outcomes/Goals                    1. PO intake to meet >75% of


                                                 estimated nutritional needs.


                                                 2. Gradual weight gain (1.0 LB


                                                 /week) trending toward IBW


                                                 preferred.


                                                 3. Weekly weight checks.


                                                 4. Monitor PO intake, wt,


                                                 nutrition related labs, and


                                                 skin integrity.


                                                 5. F/U as high risk in 2-3


                                                 days, 55-/6.

## 2020-05-12 VITALS — SYSTOLIC BLOOD PRESSURE: 111 MMHG

## 2020-05-12 VITALS — SYSTOLIC BLOOD PRESSURE: 112 MMHG

## 2020-05-12 VITALS — SYSTOLIC BLOOD PRESSURE: 100 MMHG

## 2020-05-12 VITALS — SYSTOLIC BLOOD PRESSURE: 123 MMHG

## 2020-05-12 LAB
ALBUMIN SERPL BCP-MCNC: 2.8 G/DL (ref 3.4–4.8)
ALT SERPL W P-5'-P-CCNC: 14 U/L (ref 12–78)
ANION GAP SERPL CALCULATED.3IONS-SCNC: 3 MMOL/L (ref 5–15)
APPEARANCE UR: CLEAR
AST SERPL W P-5'-P-CCNC: 18 U/L (ref 10–37)
BASOPHILS # BLD AUTO: 0.1 K/UL (ref 0–0.2)
BASOPHILS NFR BLD AUTO: 0.7 % (ref 0–2)
BILIRUB SERPL-MCNC: 0.6 MG/DL (ref 0–1)
BILIRUB UR QL STRIP: NEGATIVE
BUN SERPL-MCNC: 25 MG/DL (ref 8–21)
CALCIUM SERPL-MCNC: 9.5 MG/DL (ref 8.4–11)
CHLORIDE SERPL-SCNC: 102 MMOL/L (ref 98–107)
COLOR UR: YELLOW
CREAT SERPL-MCNC: 0.95 MG/DL (ref 0.55–1.3)
CRP SERPL-MCNC: 22.3 MG/DL (ref 0–0.5)
EOSINOPHIL # BLD AUTO: 0 K/UL (ref 0–0.4)
EOSINOPHIL NFR BLD AUTO: 0.3 % (ref 0–4)
ERYTHROCYTE [DISTWIDTH] IN BLOOD BY AUTOMATED COUNT: 14.1 % (ref 9–15)
FIBRINOGEN PPP-MCNC: 556 MG/DL (ref 200–400)
GFR SERPL CREATININE-BSD FRML MDRD: (no result) ML/MIN (ref 90–?)
GLUCOSE SERPL-MCNC: 119 MG/DL (ref 70–99)
GLUCOSE UR STRIP-MCNC: NEGATIVE MG/DL
HCT VFR BLD AUTO: 38.2 % (ref 36–54)
HGB BLD-MCNC: 12.9 G/DL (ref 14–18)
HGB UR QL STRIP: NEGATIVE
INR PPP: 1.1 (ref 0.8–1.2)
KETONES UR STRIP-MCNC: NEGATIVE MG/DL
LACTATE SERPL-SCNC: 110 U/L (ref 85–227)
LEUKOCYTE ESTERASE UR QL STRIP: NEGATIVE
LYMPHOCYTES # BLD AUTO: 2.7 K/UL (ref 1–5.5)
LYMPHOCYTES NFR BLD AUTO: 19.9 % (ref 20.5–51.5)
MCH RBC QN AUTO: 30 PG (ref 27–31)
MCHC RBC AUTO-ENTMCNC: 34 % (ref 32–36)
MCV RBC AUTO: 89 FL (ref 79–98)
MONOCYTES # BLD MANUAL: 0.6 K/UL (ref 0–1)
MONOCYTES # BLD MANUAL: 4.4 % (ref 1.7–9.3)
NEUTROPHILS # BLD AUTO: 10 K/UL (ref 1.8–7.7)
NEUTROPHILS NFR BLD AUTO: 74.7 % (ref 40–70)
NITRITE UR QL STRIP: NEGATIVE
PH UR STRIP: 6.5 [PH] (ref 5–8)
PLATELET # BLD AUTO: 344 K/UL (ref 130–430)
POTASSIUM SERPL-SCNC: 4 MMOL/L (ref 3.5–5.1)
PROT UR QL STRIP: NEGATIVE
PROTHROMBIN TIME: 10.9 SECS (ref 9.5–12.5)
RBC # BLD AUTO: 4.28 MIL/UL (ref 4.2–6.2)
SODIUM SERPLBLD-SCNC: 139 MMOL/L (ref 136–145)
SP GR UR STRIP: 1.01 (ref 1–1.03)
UROBILINOGEN UR STRIP-MCNC: 0.2 MG/DL (ref 0.2–1)
WBC # BLD AUTO: 13.4 K/UL (ref 4.8–10.8)

## 2020-05-12 RX ADMIN — SODIUM CHLORIDE SCH MLS/HR: 9 INJECTION, SOLUTION INTRAVENOUS at 14:45

## 2020-05-12 RX ADMIN — PROPRANOLOL HYDROCHLORIDE SCH MG: 10 TABLET ORAL at 08:51

## 2020-05-12 RX ADMIN — DONEPEZIL HYDROCHLORIDE SCH MG: 5 TABLET, FILM COATED ORAL at 08:51

## 2020-05-12 RX ADMIN — PROPRANOLOL HYDROCHLORIDE SCH MG: 10 TABLET ORAL at 21:48

## 2020-05-12 RX ADMIN — CARBIDOPA AND LEVODOPA SCH TAB: 10; 100 TABLET ORAL at 08:52

## 2020-05-12 RX ADMIN — CARBIDOPA AND LEVODOPA SCH TAB: 10; 100 TABLET ORAL at 15:00

## 2020-05-12 RX ADMIN — CARBIDOPA AND LEVODOPA SCH TAB: 10; 100 TABLET ORAL at 21:48

## 2020-05-12 RX ADMIN — TRAZODONE HYDROCHLORIDE SCH MG: 50 TABLET ORAL at 21:48

## 2020-05-13 VITALS — SYSTOLIC BLOOD PRESSURE: 108 MMHG

## 2020-05-13 VITALS — SYSTOLIC BLOOD PRESSURE: 97 MMHG

## 2020-05-13 VITALS — SYSTOLIC BLOOD PRESSURE: 106 MMHG

## 2020-05-13 VITALS — SYSTOLIC BLOOD PRESSURE: 105 MMHG

## 2020-05-13 RX ADMIN — CARBIDOPA AND LEVODOPA SCH TAB: 10; 100 TABLET ORAL at 15:18

## 2020-05-13 RX ADMIN — TAZOBACTAM SODIUM AND PIPERACILLIN SODIUM SCH MLS/HR: 375; 3 INJECTION, SOLUTION INTRAVENOUS at 12:59

## 2020-05-13 RX ADMIN — SODIUM CHLORIDE SCH MLS/HR: 9 INJECTION, SOLUTION INTRAVENOUS at 15:18

## 2020-05-13 RX ADMIN — TAZOBACTAM SODIUM AND PIPERACILLIN SODIUM SCH MLS/HR: 375; 3 INJECTION, SOLUTION INTRAVENOUS at 00:52

## 2020-05-13 RX ADMIN — PROPRANOLOL HYDROCHLORIDE SCH MG: 10 TABLET ORAL at 09:00

## 2020-05-13 RX ADMIN — PROPRANOLOL HYDROCHLORIDE SCH MG: 10 TABLET ORAL at 21:00

## 2020-05-13 RX ADMIN — DONEPEZIL HYDROCHLORIDE SCH MG: 5 TABLET, FILM COATED ORAL at 09:27

## 2020-05-13 RX ADMIN — CARBIDOPA AND LEVODOPA SCH TAB: 10; 100 TABLET ORAL at 21:00

## 2020-05-13 RX ADMIN — ENOXAPARIN SODIUM SCH MG: 30 INJECTION SUBCUTANEOUS at 09:28

## 2020-05-13 RX ADMIN — CARBIDOPA AND LEVODOPA SCH TAB: 10; 100 TABLET ORAL at 09:27

## 2020-05-13 RX ADMIN — TAZOBACTAM SODIUM AND PIPERACILLIN SODIUM SCH MLS/HR: 375; 3 INJECTION, SOLUTION INTRAVENOUS at 23:13

## 2020-05-13 RX ADMIN — TAZOBACTAM SODIUM AND PIPERACILLIN SODIUM SCH MLS/HR: 375; 3 INJECTION, SOLUTION INTRAVENOUS at 18:22

## 2020-05-13 RX ADMIN — TRAZODONE HYDROCHLORIDE SCH MG: 50 TABLET ORAL at 21:00

## 2020-05-13 RX ADMIN — TAZOBACTAM SODIUM AND PIPERACILLIN SODIUM SCH MLS/HR: 375; 3 INJECTION, SOLUTION INTRAVENOUS at 06:00

## 2020-05-14 ENCOUNTER — HOSPITAL ENCOUNTER (INPATIENT)
Dept: HOSPITAL 36 - GERO | Age: 85
LOS: 8 days | Discharge: NURSING, REHAB OR LONG-TERM CARE | DRG: 884 | End: 2020-05-22
Attending: PSYCHIATRY & NEUROLOGY | Admitting: PSYCHIATRY & NEUROLOGY
Payer: MEDICARE

## 2020-05-14 VITALS — SYSTOLIC BLOOD PRESSURE: 103 MMHG

## 2020-05-14 VITALS — SYSTOLIC BLOOD PRESSURE: 105 MMHG

## 2020-05-14 VITALS — SYSTOLIC BLOOD PRESSURE: 101 MMHG | DIASTOLIC BLOOD PRESSURE: 76 MMHG

## 2020-05-14 VITALS — SYSTOLIC BLOOD PRESSURE: 108 MMHG

## 2020-05-14 VITALS — SYSTOLIC BLOOD PRESSURE: 106 MMHG

## 2020-05-14 DIAGNOSIS — F41.9: ICD-10-CM

## 2020-05-14 DIAGNOSIS — J18.9: ICD-10-CM

## 2020-05-14 DIAGNOSIS — E43: ICD-10-CM

## 2020-05-14 DIAGNOSIS — F03.90: Primary | ICD-10-CM

## 2020-05-14 DIAGNOSIS — F29: ICD-10-CM

## 2020-05-14 PROCEDURE — Z7610: HCPCS

## 2020-05-14 RX ADMIN — CARBIDOPA AND LEVODOPA SCH TAB: 10; 100 TABLET ORAL at 15:05

## 2020-05-14 RX ADMIN — CARBIDOPA AND LEVODOPA SCH TAB: 10; 100 TABLET ORAL at 10:12

## 2020-05-14 RX ADMIN — ENOXAPARIN SODIUM SCH MG: 30 INJECTION SUBCUTANEOUS at 10:12

## 2020-05-14 RX ADMIN — DONEPEZIL HYDROCHLORIDE SCH MG: 5 TABLET, FILM COATED ORAL at 10:12

## 2020-05-14 RX ADMIN — SODIUM CHLORIDE SCH MLS/HR: 9 INJECTION, SOLUTION INTRAVENOUS at 14:45

## 2020-05-14 RX ADMIN — TAZOBACTAM SODIUM AND PIPERACILLIN SODIUM SCH MLS/HR: 375; 3 INJECTION, SOLUTION INTRAVENOUS at 12:02

## 2020-05-14 RX ADMIN — TAZOBACTAM SODIUM AND PIPERACILLIN SODIUM SCH MLS/HR: 375; 3 INJECTION, SOLUTION INTRAVENOUS at 06:00

## 2020-05-14 RX ADMIN — PROPRANOLOL HYDROCHLORIDE SCH MG: 10 TABLET ORAL at 09:00

## 2020-05-15 RX ADMIN — IPRATROPIUM BROMIDE AND ALBUTEROL SULFATE SCH ML: .5; 3 SOLUTION RESPIRATORY (INHALATION) at 18:13

## 2020-05-15 RX ADMIN — CARBIDOPA AND LEVODOPA SCH TAB: 10; 100 TABLET ORAL at 20:30

## 2020-05-15 RX ADMIN — IPRATROPIUM BROMIDE AND ALBUTEROL SULFATE SCH: .5; 3 SOLUTION RESPIRATORY (INHALATION) at 01:20

## 2020-05-15 RX ADMIN — CARBIDOPA AND LEVODOPA SCH TAB: 10; 100 TABLET ORAL at 15:30

## 2020-05-15 RX ADMIN — Medication SCH MG: at 08:36

## 2020-05-15 RX ADMIN — IPRATROPIUM BROMIDE AND ALBUTEROL SULFATE SCH ML: .5; 3 SOLUTION RESPIRATORY (INHALATION) at 06:44

## 2020-05-15 RX ADMIN — IPRATROPIUM BROMIDE AND ALBUTEROL SULFATE SCH ML: .5; 3 SOLUTION RESPIRATORY (INHALATION) at 12:28

## 2020-05-15 RX ADMIN — CARBIDOPA AND LEVODOPA SCH TAB: 10; 100 TABLET ORAL at 08:35

## 2020-05-15 NOTE — PSYCHIATRIC EVALUATION
DATE OF SERVICE:  05/15/2020



HISTORY OF PRESENT ILLNESS:  An 84-year-old male, currently back in the

hospital, history of aggression, agitation, yelling, screaming, currently on a

hold, grave disability.  On face-to-face, the patient quiet, mostly withdrawn,

keeps to self, poor historian, very confused, was here before, medically decline

sent to an outside hospital, AO to name only, forgetful, does not know where he

is or what is going on, does not know the month or the year.  I attempted to

call the family last time he was here.  The patient has no idea where he is.



PAST PSYCHIATRIC HISTORY:  Admissions in the past.



FAMILY HISTORY:  Unclear.



SOCIAL HISTORY:  Coming from a skilled nursing.



MEDICATIONS:  Noted.



MENTAL STATUS EXAMINATION:  Stated age.  Fair eye contact.  Sleeping, but

arousable, AO to name only.  No overt SI or HI, disorganized, confused,

disoriented, poor insight, poor impulse control.



DIAGNOSES:  Dementia; mood, unspecified; anxiety, unspecified; psychosis,

unspecified.



MEDICAL:  Please see full H and P.



ESTIMATED LENGTH OF STAY:  5-7 days.



ASSESSMENT:  The patient requiring hospitalization, impulsive, unpredictable,

history of agitation.  We will readjust medications.



TREATMENT PLAN:  Includes group as well as milieu therapy.



CONDITIONS FOR DISCHARGE:  Improved mood, improved affect, better control of

mood symptoms.





DD: 05/15/2020 11:01

DT: 05/15/2020 11:24

JOB# [id number]  [id number]

## 2020-05-15 NOTE — INTERNAL MEDICINE PROG NOTE
Internal Medicine Subjective





- Subjective


Service Date: 05/15/20


Patient seen and examined:: without staff


Patient is:: awake, asleep


Per staff patient has:: no adverse event, no episodes of fall





Internal Medicine Objective





- Physical Exam


Vitals and I&O: 


 Vital Signs











Temp  97.8 F   05/15/20 14:00


 


Pulse  89   05/15/20 14:00


 


Resp  20   05/15/20 14:00


 


BP  108/74   05/15/20 14:00


 


Pulse Ox  98   05/15/20 14:00








 Intake & Output











 05/14/20 05/15/20 05/15/20





 18:59 06:59 18:59


 


Intake Total  120 


 


Balance  120 


 


Weight (lbs) 46.448 kg  


 


Intake:   


 


  Oral  120 


 


Other:   


 


  # Voids  2 


 


  # Bowel Movements  0 


 


  Stool Characteristics  Soft 





  Brown 


 


  Weight Source Bedscale  











Active Medications: 


Current Medications





Acetaminophen (Tylenol)  650 mg PO Q4H PRN


   PRN Reason: Pain (Mild 1-3)


   Stop: 07/13/20 19:27


Acetaminophen (Tylenol)  650 mg PO Q4H PRN


   PRN Reason: Temperature above 101


   Stop: 07/13/20 21:41


Al Hydrox/Mg Hydrox/Simethicone (Maalox)  30 ml PO Q4HR PRN


   PRN Reason: GI DISTRESS


   Stop: 07/13/20 19:27


Albuterol/Ipratropium (Duoneb Neb)  3 ml HHN Q6HRT [facility]


   Stop: 07/14/20 00:59


   Last Admin: 05/15/20 12:28 Dose:  3 ml


Bisacodyl (Dulcolax 10 Mg Supp)  10 mg RC DAILY PRN


   PRN Reason: constipation


   Stop: 07/13/20 21:41


Carbidopa/Levodopa (Sinemet 10 Mg-100 Mg)  1 tab PO TID [facility]


   Stop: 07/14/20 08:59


   Last Admin: 05/15/20 08:35 Dose:  1 tab


Donepezil HCl (Aricept)  5 mg PO DAILY [facility]


   Stop: 07/14/20 08:59


   Last Admin: 05/15/20 08:35 Dose:  5 mg


Fish Oil (Omega 3)  1,000 mg PO DAILY [facility]


   Stop: 07/14/20 08:59


   Last Admin: 05/15/20 08:36 Dose:  1,000 mg


Ibuprofen (Motrin)  400 mg PO Q4H PRN


   PRN Reason: Pain (Severe 7-10)


   Stop: 07/13/20 19:27


Lorazepam (Ativan)  0.5 mg PO Q4HR PRN; Protocol


   PRN Reason: Anxiety


   Stop: 06/13/20 19:27


Magnesium Hydroxide (Milk Of Magnesia)  30 ml PO HS PRN


   PRN Reason: Constipation


Multivitamins/Vitamin C (Theragran)  1 tab PO DAILY [facility]


   Stop: 07/14/20 08:59


   Last Admin: 05/15/20 08:34 Dose:  1 tab


Propranolol HCl (Inderal)  10 mg PO BID [facility]


   Stop: 07/14/20 08:59


   Last Admin: 05/15/20 08:36 Dose:  Not Given








General: weak


HEENT: NC/AT, PERRLA


Neck: Supple


Lungs: CTAB


Cardiovascular: RRR, Normal S1, Normal S2


Abdomen: soft, non-tender


Extremities: clear





Internal Medicine Assmt/Plan





- Assessment


Assessment: 


1. Pneumonia


 2. Dementia with psychosis


 3. Agitation





- Plan


Plan: 





continue supportive care


continue levaquin 500 mg po daily

## 2020-05-16 RX ADMIN — CARBIDOPA AND LEVODOPA SCH TAB: 10; 100 TABLET ORAL at 21:47

## 2020-05-16 RX ADMIN — CARBIDOPA AND LEVODOPA SCH TAB: 10; 100 TABLET ORAL at 08:48

## 2020-05-16 RX ADMIN — IPRATROPIUM BROMIDE AND ALBUTEROL SULFATE SCH ML: .5; 3 SOLUTION RESPIRATORY (INHALATION) at 13:29

## 2020-05-16 RX ADMIN — Medication SCH MG: at 08:48

## 2020-05-16 RX ADMIN — IPRATROPIUM BROMIDE AND ALBUTEROL SULFATE SCH ML: .5; 3 SOLUTION RESPIRATORY (INHALATION) at 07:46

## 2020-05-16 RX ADMIN — IPRATROPIUM BROMIDE AND ALBUTEROL SULFATE SCH: .5; 3 SOLUTION RESPIRATORY (INHALATION) at 19:30

## 2020-05-16 RX ADMIN — CARBIDOPA AND LEVODOPA SCH TAB: 10; 100 TABLET ORAL at 13:29

## 2020-05-16 NOTE — PROGRESS NOTES
DATE:  05/16/2020



SUBJECTIVE:  An 84-year-old male back in the hospital, history of aggression and

agitation, calm yesterday, pretty calm today, mostly withdrawn, keeps to self,

quiet.  No behavioral disturbances, resting in bed quietly, has been more

medically unstable while he has been here.  [facility] does

not want the patient back, ombudsman involved.  Fair sleep and appetite.



Medications were reviewed.  Labs were reviewed.  Vitals were reviewed.  Blood

pressure 108/74, pulse of 89.



ASSESSMENT:  An 84-year-old male, dementia, seems to be calmer, no behavioral

outbursts as of late, but remains somewhat impulsive, unpredictable.



PLAN:  We will continue to monitor.  We are actively trying to work on

placement.





DD: 05/16/2020 06:46

DT: 05/16/2020 09:48

JOB# [id number]  [id number]

## 2020-05-17 RX ADMIN — CARBIDOPA AND LEVODOPA SCH TAB: 10; 100 TABLET ORAL at 13:02

## 2020-05-17 RX ADMIN — IPRATROPIUM BROMIDE AND ALBUTEROL SULFATE SCH ML: .5; 3 SOLUTION RESPIRATORY (INHALATION) at 18:10

## 2020-05-17 RX ADMIN — CARBIDOPA AND LEVODOPA SCH TAB: 10; 100 TABLET ORAL at 08:56

## 2020-05-17 RX ADMIN — Medication SCH MG: at 08:57

## 2020-05-17 RX ADMIN — IPRATROPIUM BROMIDE AND ALBUTEROL SULFATE SCH: .5; 3 SOLUTION RESPIRATORY (INHALATION) at 01:30

## 2020-05-17 RX ADMIN — IPRATROPIUM BROMIDE AND ALBUTEROL SULFATE SCH ML: .5; 3 SOLUTION RESPIRATORY (INHALATION) at 12:24

## 2020-05-17 RX ADMIN — CARBIDOPA AND LEVODOPA SCH TAB: 10; 100 TABLET ORAL at 21:20

## 2020-05-17 RX ADMIN — IPRATROPIUM BROMIDE AND ALBUTEROL SULFATE SCH ML: .5; 3 SOLUTION RESPIRATORY (INHALATION) at 06:48

## 2020-05-17 NOTE — PROGRESS NOTES
DATE:  05/17/2020



SUBJECTIVE:  An 84-year-old male, currently in the hospital, sleeping,

arousable, speaks in a low voice, very confused, able to follow some commands. 

No agitation, no escalation of behaviors, seems to be at his baseline.  He is

not particularly aggressive.  He does yell at times, possibly out of frustration

and discomfort.



Medications reviewed.  Labs reviewed.  Vitals reviewed.  Blood pressure 110/65,

pulse of 94.



ASSESSMENT:  An 84-year-old male, some yelling episodes, very confused,

oriented, but no agitation, not trying to hit anybody.  We will continue

inpatient monitoring.  Discussed at length with staff.





DD: 05/17/2020 05:53

DT: 05/17/2020 11:31

JOB# [id number]  [id number]

## 2020-05-18 RX ADMIN — IPRATROPIUM BROMIDE AND ALBUTEROL SULFATE SCH ML: .5; 3 SOLUTION RESPIRATORY (INHALATION) at 13:37

## 2020-05-18 RX ADMIN — IPRATROPIUM BROMIDE AND ALBUTEROL SULFATE SCH: .5; 3 SOLUTION RESPIRATORY (INHALATION) at 01:10

## 2020-05-18 RX ADMIN — CARBIDOPA AND LEVODOPA SCH TAB: 10; 100 TABLET ORAL at 22:01

## 2020-05-18 RX ADMIN — Medication SCH MG: at 08:42

## 2020-05-18 RX ADMIN — CARBIDOPA AND LEVODOPA SCH TAB: 10; 100 TABLET ORAL at 13:37

## 2020-05-18 RX ADMIN — IPRATROPIUM BROMIDE AND ALBUTEROL SULFATE SCH ML: .5; 3 SOLUTION RESPIRATORY (INHALATION) at 06:39

## 2020-05-18 RX ADMIN — IPRATROPIUM BROMIDE AND ALBUTEROL SULFATE SCH ML: .5; 3 SOLUTION RESPIRATORY (INHALATION) at 18:04

## 2020-05-18 RX ADMIN — CARBIDOPA AND LEVODOPA SCH TAB: 10; 100 TABLET ORAL at 08:42

## 2020-05-18 NOTE — INTERNAL MEDICINE PROG NOTE
Internal Medicine Subjective





- Subjective


Service Date: 05/18/20


Patient seen and examined:: without staff


Patient is:: awake, asleep


Per staff patient has:: no adverse event, no episodes of fall





Internal Medicine Objective





- Physical Exam


Vitals and I&O: 


 Vital Signs











Temp  97.7 F   05/18/20 06:41


 


Pulse  112   05/18/20 08:43


 


Resp  18   05/18/20 08:00


 


BP  109/66   05/18/20 08:43


 


Pulse Ox  94   05/18/20 06:41








 Intake & Output











 05/17/20 05/18/20 05/18/20





 18:59 06:59 18:59


 


Intake Total 800 360 


 


Balance 800 360 


 


Intake:   


 


  Oral 800 360 


 


Other:   


 


  # Voids 3 1 


 


  # Bowel Movements 0  











Active Medications: 


Current Medications





Acetaminophen (Tylenol)  650 mg PO Q4H PRN


   PRN Reason: Pain (Mild 1-3)


   Stop: 07/13/20 19:27


   Last Admin: 05/17/20 18:31 Dose:  650 mg


Acetaminophen (Tylenol)  650 mg PO Q4H PRN


   PRN Reason: Temperature above 101


   Stop: 07/13/20 21:41


   Last Admin: 05/17/20 13:18 Dose:  650 mg


Al Hydrox/Mg Hydrox/Simethicone (Maalox)  30 ml PO Q4HR PRN


   PRN Reason: GI DISTRESS


   Stop: 07/13/20 19:27


Albuterol/Ipratropium (Duoneb Neb)  3 ml HHN Q6HRT [facility]


   Stop: 07/14/20 00:59


   Last Admin: 05/18/20 13:37 Dose:  3 ml


Bisacodyl (Dulcolax 10 Mg Supp)  10 mg RC DAILY PRN


   PRN Reason: constipation


   Stop: 07/13/20 21:41


Carbidopa/Levodopa (Sinemet 10 Mg-100 Mg)  1 tab PO TID [facility]


   Stop: 07/14/20 08:59


   Last Admin: 05/18/20 13:37 Dose:  1 tab


Donepezil HCl (Aricept)  5 mg PO DAILY [facility]


   Stop: 07/14/20 08:59


   Last Admin: 05/18/20 08:42 Dose:  5 mg


Fish Oil (Omega 3)  1,000 mg PO DAILY [facility]


   Stop: 07/14/20 08:59


   Last Admin: 05/18/20 08:42 Dose:  1,000 mg


Ibuprofen (Motrin)  400 mg PO Q4H PRN


   PRN Reason: Pain (Severe 7-10)


   Stop: 07/13/20 19:27


Levofloxacin (Levaquin)  500 mg PO DAILY [facility]


   Stop: 07/15/20 08:59


   Last Admin: 05/18/20 08:42 Dose:  500 mg


Lorazepam (Ativan)  0.5 mg PO Q4HR PRN; Protocol


   PRN Reason: Anxiety


   Stop: 06/13/20 19:27


Magnesium Hydroxide (Milk Of Magnesia)  30 ml PO HS PRN


   PRN Reason: Constipation


Multivitamins/Vitamin C (Theragran)  1 tab PO DAILY [facility]


   Stop: 07/14/20 08:59


   Last Admin: 05/18/20 08:42 Dose:  1 tab


Propranolol HCl (Inderal)  10 mg PO BID [facility]


   Stop: 07/14/20 08:59


   Last Admin: 05/18/20 08:43 Dose:  Not Given








General: weak


HEENT: NC/AT, PERRLA


Neck: Supple


Lungs: CTAB


Cardiovascular: RRR, Normal S1, Normal S2


Abdomen: soft, non-tender


Extremities: clear


Neurological: no change





Internal Medicine Assmt/Plan





- Assessment


Assessment: 


1. Pneumonia


 2. Dementia with psychosis


 3. Agitation





- Plan


Plan: 








continue levaquin 500 mg po daily


check chest x-ray


d/w r.n.

## 2020-05-18 NOTE — PROGRESS NOTES
DATE:  05/18/2020



SUBJECTIVE:  An 84-year-old male, currently in the hospital, still disoriented,

is sometimes able to make his needs known, very confused, mostly withdrawn,

keeps to self, needing high level of prompting, redirection.  No overt

agitation, no escalation of behaviors, sometimes yells, hard to understand what

he is saying, follows simple commands.



Medications were reviewed.  Labs reviewed.  Vitals reviewed, blood pressure

104/50, pulse ranges 112-114.  Currently on dosing of Aricept, also Inderal

b.i.d. dosing.



ASSESSMENT:  An 84-year-old male, seems to be calm, generally more cooperative.



PLAN:  We will coordinate care with  regarding a safe

disposition.





DD: 05/18/2020 10:58

DT: 05/18/2020 12:55

JOB# [id number]  [id number]

## 2020-05-19 RX ADMIN — IPRATROPIUM BROMIDE AND ALBUTEROL SULFATE SCH ML: .5; 3 SOLUTION RESPIRATORY (INHALATION) at 18:36

## 2020-05-19 RX ADMIN — CARBIDOPA AND LEVODOPA SCH: 10; 100 TABLET ORAL at 15:06

## 2020-05-19 RX ADMIN — IPRATROPIUM BROMIDE AND ALBUTEROL SULFATE SCH ML: .5; 3 SOLUTION RESPIRATORY (INHALATION) at 06:35

## 2020-05-19 RX ADMIN — CARBIDOPA AND LEVODOPA SCH TAB: 10; 100 TABLET ORAL at 08:57

## 2020-05-19 RX ADMIN — Medication SCH MG: at 08:57

## 2020-05-19 RX ADMIN — IPRATROPIUM BROMIDE AND ALBUTEROL SULFATE SCH ML: .5; 3 SOLUTION RESPIRATORY (INHALATION) at 12:19

## 2020-05-19 RX ADMIN — IPRATROPIUM BROMIDE AND ALBUTEROL SULFATE SCH: .5; 3 SOLUTION RESPIRATORY (INHALATION) at 01:15

## 2020-05-19 RX ADMIN — CARBIDOPA AND LEVODOPA SCH TAB: 10; 100 TABLET ORAL at 20:59

## 2020-05-19 NOTE — DIAGNOSTIC IMAGING REPORT
Portable chest x-ray

Time: 0 944



COMPARISON: 05/11/2020



History: Cough



Allowing for portable technique the heart size is normal. No focal

pulmonary parenchymal processes. No hilar or mediastinal abnormalities.



Impression: No acute abnormalities.

## 2020-05-19 NOTE — PROGRESS NOTES
DATE:  05/19/2020



SUBJECTIVE:  An 84-year-old male, currently in the hospital, ongoing confusion,

disorientation, limited face-to-face, not really saying much.  AO to name only. 

No distress.  No escalation of behaviors, likely approaching his baseline.  Fair

sleep and appetite.  Staff noting he has been redirectable.  No outbursts.



Medications were reviewed.  Labs were reviewed.  Vitals were reviewed.



ASSESSMENT:  An 84-year-old male with ongoing confusional state, likely

approaching his baseline.



PLAN:  We will coordinate care with  regarding a safe discharge

plan.





DD: 05/19/2020 13:29

DT: 05/19/2020 16:03

JOB# [id number]  [id number]

## 2020-05-20 RX ADMIN — IPRATROPIUM BROMIDE AND ALBUTEROL SULFATE SCH: .5; 3 SOLUTION RESPIRATORY (INHALATION) at 14:00

## 2020-05-20 RX ADMIN — IPRATROPIUM BROMIDE AND ALBUTEROL SULFATE SCH ML: .5; 3 SOLUTION RESPIRATORY (INHALATION) at 06:43

## 2020-05-20 RX ADMIN — IPRATROPIUM BROMIDE AND ALBUTEROL SULFATE SCH: .5; 3 SOLUTION RESPIRATORY (INHALATION) at 18:42

## 2020-05-20 RX ADMIN — Medication SCH MG: at 09:21

## 2020-05-20 RX ADMIN — IPRATROPIUM BROMIDE AND ALBUTEROL SULFATE SCH ML: .5; 3 SOLUTION RESPIRATORY (INHALATION) at 14:00

## 2020-05-20 RX ADMIN — CARBIDOPA AND LEVODOPA SCH TAB: 10; 100 TABLET ORAL at 09:21

## 2020-05-20 RX ADMIN — IPRATROPIUM BROMIDE AND ALBUTEROL SULFATE SCH: .5; 3 SOLUTION RESPIRATORY (INHALATION) at 01:20

## 2020-05-20 RX ADMIN — CARBIDOPA AND LEVODOPA SCH TAB: 10; 100 TABLET ORAL at 21:26

## 2020-05-20 RX ADMIN — CARBIDOPA AND LEVODOPA SCH TAB: 10; 100 TABLET ORAL at 15:00

## 2020-05-20 NOTE — INTERNAL MEDICINE PROG NOTE
Internal Medicine Subjective





- Subjective


Patient seen and examined:: without staff


Patient is:: awake, asleep


Per staff patient has:: no adverse event, no episodes of fall





Internal Medicine Objective





- Physical Exam


Vitals and I&O: 


 Vital Signs











Temp  98.8 F   05/20/20 05:31


 


Pulse  100   05/20/20 09:18


 


Resp  20   05/20/20 05:31


 


BP  109/58   05/20/20 09:18


 


Pulse Ox  98   05/20/20 05:31








 Intake & Output











 05/19/20 05/20/20 05/20/20





 18:59 06:59 18:59


 


Intake Total 800 240 350


 


Balance 800 240 350


 


Intake:   


 


  Oral 800 240 350


 


Other:   


 


  # Voids 4 2 


 


  # Bowel Movements 0  











Active Medications: 


Current Medications





Acetaminophen (Tylenol)  650 mg PO Q4H PRN


   PRN Reason: Pain (Mild 1-3)


   Stop: 07/13/20 19:27


   Last Admin: 05/17/20 18:31 Dose:  650 mg


Acetaminophen (Tylenol)  650 mg PO Q4H PRN


   PRN Reason: Temperature above 101


   Stop: 07/13/20 21:41


   Last Admin: 05/17/20 13:18 Dose:  650 mg


Al Hydrox/Mg Hydrox/Simethicone (Maalox)  30 ml PO Q4HR PRN


   PRN Reason: GI DISTRESS


   Stop: 07/13/20 19:27


Albuterol/Ipratropium (Duoneb Neb)  3 ml HHN Q6HRT [facility]


   Stop: 07/14/20 00:59


   Last Admin: 05/20/20 06:43 Dose:  3 ml


Bisacodyl (Dulcolax 10 Mg Supp)  10 mg RC DAILY PRN


   PRN Reason: constipation


   Stop: 07/13/20 21:41


Carbidopa/Levodopa (Sinemet 10 Mg-100 Mg)  1 tab PO TID [facility]


   Stop: 07/14/20 08:59


   Last Admin: 05/20/20 09:21 Dose:  1 tab


Donepezil HCl (Aricept)  5 mg PO DAILY [facility]


   Stop: 07/14/20 08:59


   Last Admin: 05/20/20 09:21 Dose:  5 mg


Fish Oil (Omega 3)  1,000 mg PO DAILY [facility]


   Stop: 07/14/20 08:59


   Last Admin: 05/20/20 09:21 Dose:  1,000 mg


Ibuprofen (Motrin)  400 mg PO Q4H PRN


   PRN Reason: Pain (Severe 7-10)


   Stop: 07/13/20 19:27


Levofloxacin (Levaquin)  500 mg PO DAILY [facility]


   Stop: 07/15/20 08:59


   Last Admin: 05/20/20 09:21 Dose:  500 mg


Lorazepam (Ativan)  0.5 mg PO Q4HR PRN; Protocol


   PRN Reason: Anxiety


   Stop: 06/13/20 19:27


Multivitamins/Vitamin C (Theragran)  1 tab PO DAILY [facility]


   Stop: 07/14/20 08:59


   Last Admin: 05/20/20 09:21 Dose:  1 tab


Propranolol HCl (Inderal)  10 mg PO BID [facility]


   Stop: 07/14/20 08:59


   Last Admin: 05/20/20 09:18 Dose:  Not Given








General: weak


HEENT: NC/AT, PERRLA


Neck: Supple


Lungs: CTAB


Cardiovascular: RRR, Normal S1, Normal S2


Abdomen: soft, non-tender


Extremities: clear


Neurological: no change





Internal Medicine Assmt/Plan





- Assessment


Assessment: 


1. Pneumonia


 2. Dementia with psychosis


 3. Agitation





- Plan


Plan: 








continue levaquin 500 mg po daily


continue duonebs q 4 hours prn


d/w r.n.

## 2020-05-20 NOTE — PROGRESS NOTES
DATE:  05/20/2020



SUBJECTIVE:  An 84-year-old male, currently in the hospital, ongoing confusion

and disorientation, sleeping, arousable, does not say much on exam.  Nursing

notes indicate he has been sleeping very well.  No agitation.  No escalation of

behaviors.  No distress.  Sometimes yells, but few and far between,

redirectable.



Medications were reviewed.  Labs were reviewed.  Vitals reviewed, blood pressure

120/66, pulse ranges from .



ASSESSMENT:  An 84-year-old male, calm, likely at his baseline, pending a safe

disposition.





DD: 05/20/2020 11:53

DT: 05/20/2020 13:53

JOB# [id number]  [id number]

## 2020-05-21 RX ADMIN — CARBIDOPA AND LEVODOPA SCH TAB: 10; 100 TABLET ORAL at 21:32

## 2020-05-21 RX ADMIN — IPRATROPIUM BROMIDE AND ALBUTEROL SULFATE SCH ML: .5; 3 SOLUTION RESPIRATORY (INHALATION) at 13:48

## 2020-05-21 RX ADMIN — IPRATROPIUM BROMIDE AND ALBUTEROL SULFATE SCH: .5; 3 SOLUTION RESPIRATORY (INHALATION) at 07:02

## 2020-05-21 RX ADMIN — Medication SCH MG: at 08:35

## 2020-05-21 RX ADMIN — IPRATROPIUM BROMIDE AND ALBUTEROL SULFATE SCH ML: .5; 3 SOLUTION RESPIRATORY (INHALATION) at 18:13

## 2020-05-21 RX ADMIN — CARBIDOPA AND LEVODOPA SCH TAB: 10; 100 TABLET ORAL at 08:35

## 2020-05-21 RX ADMIN — CARBIDOPA AND LEVODOPA SCH TAB: 10; 100 TABLET ORAL at 14:31

## 2020-05-21 RX ADMIN — IPRATROPIUM BROMIDE AND ALBUTEROL SULFATE SCH: .5; 3 SOLUTION RESPIRATORY (INHALATION) at 01:12

## 2020-05-21 NOTE — INTERNAL MEDICINE PROG NOTE
Internal Medicine Subjective





- Subjective


Service Date: 05/21/20


Patient seen and examined:: without staff


Patient is:: awake, asleep


Per staff patient has:: no adverse event, no episodes of fall





Internal Medicine Objective





- Physical Exam


Vitals and I&O: 


 Vital Signs











Temp  98.6 F   05/21/20 14:00


 


Pulse  92   05/21/20 14:00


 


Resp  20   05/21/20 15:00


 


BP  103/61   05/21/20 14:00


 


Pulse Ox  96   05/21/20 14:00








 Intake & Output











 05/20/20 05/21/20 05/21/20





 18:59 06:59 18:59


 


Intake Total 750  


 


Balance 750  


 


Intake:   


 


  Oral 750  


 


Other:   


 


  # Voids 2 3 











Active Medications: 


Current Medications





Acetaminophen (Tylenol)  650 mg PO Q4H PRN


   PRN Reason: Pain (Mild 1-3)


   Stop: 07/13/20 19:27


   Last Admin: 05/17/20 18:31 Dose:  650 mg


Acetaminophen (Tylenol)  650 mg PO Q4H PRN


   PRN Reason: Temperature above 101


   Stop: 07/13/20 21:41


   Last Admin: 05/17/20 13:18 Dose:  650 mg


Al Hydrox/Mg Hydrox/Simethicone (Maalox)  30 ml PO Q4HR PRN


   PRN Reason: GI DISTRESS


   Stop: 07/13/20 19:27


Albuterol/Ipratropium (Duoneb Neb)  3 ml HHN Q6HRT [facility]


   Stop: 07/14/20 00:59


   Last Admin: 05/21/20 13:48 Dose:  3 ml


Bisacodyl (Dulcolax 10 Mg Supp)  10 mg RC DAILY PRN


   PRN Reason: constipation


   Stop: 07/13/20 21:41


Carbidopa/Levodopa (Sinemet 10 Mg-100 Mg)  1 tab PO TID [facility]


   Stop: 07/14/20 08:59


   Last Admin: 05/21/20 14:31 Dose:  1 tab


Donepezil HCl (Aricept)  5 mg PO DAILY [facility]


   Stop: 07/14/20 08:59


   Last Admin: 05/21/20 08:35 Dose:  5 mg


Fish Oil (Omega 3)  1,000 mg PO DAILY [facility]


   Stop: 07/14/20 08:59


   Last Admin: 05/21/20 08:35 Dose:  1,000 mg


Ibuprofen (Motrin)  400 mg PO Q4H PRN


   PRN Reason: Pain (Severe 7-10)


   Stop: 07/13/20 19:27


Levofloxacin (Levaquin)  500 mg PO DAILY [facility]


   Stop: 07/15/20 08:59


   Last Admin: 05/21/20 08:35 Dose:  500 mg


Lorazepam (Ativan)  0.5 mg PO Q4HR PRN; Protocol


   PRN Reason: Anxiety


   Stop: 06/13/20 19:27


Multivitamins/Vitamin C (Theragran)  1 tab PO DAILY [facility]


   Stop: 07/14/20 08:59


   Last Admin: 05/21/20 08:36 Dose:  1 tab


Propranolol HCl (Inderal)  10 mg PO BID [facility]


   Stop: 07/14/20 08:59


   Last Admin: 05/21/20 08:34 Dose:  Not Given








General: weak


HEENT: NC/AT, PERRLA


Neck: Supple


Lungs: CTAB


Cardiovascular: RRR, Normal S1, Normal S2


Abdomen: soft, non-tender


Extremities: clear


Neurological: no change





Internal Medicine Assmt/Plan





- Assessment


Assessment: 


1. Pneumonia


 2. Dementia with psychosis


 3. Agitation





- Plan


Plan: 








continue levaquin 500 mg po daily


continue duonebs q 4 hours prn


stop levaquin after completing 7 day course


d/w r.n.

## 2020-05-21 NOTE — PROGRESS NOTES
DATE:  05/21/2020



SUBJECTIVE:  The patient is currently in the hospital.  At this point, we are

trying to discharge the patient to a lower level of care.  He is at his

baseline, just confused, disoriented, just mostly sleeping a lot of the day.  He

is not aggressive or particularly agitated.  He is not exhibiting any overt

behavioral disturbances.   is heavily involved with the family

ombudsman and also referrals to different skilled nursing.



Medications were reviewed.  Labs reviewed.  Vitals reviewed, blood pressure

110/70, pulse of 95.



ASSESSMENT:  An 84-year-old male at his baseline demented, confused, but not

agitated.



PLAN:  We will continue inpatient monitoring.   is trying to work

on placement.





DD: 05/21/2020 14:51

DT: 05/21/2020 16:53

JOB# [id number]  [id number]

## 2020-05-22 RX ADMIN — CARBIDOPA AND LEVODOPA SCH: 10; 100 TABLET ORAL at 14:46

## 2020-05-22 RX ADMIN — Medication SCH MG: at 08:44

## 2020-05-22 RX ADMIN — IPRATROPIUM BROMIDE AND ALBUTEROL SULFATE SCH ML: .5; 3 SOLUTION RESPIRATORY (INHALATION) at 13:11

## 2020-05-22 RX ADMIN — CARBIDOPA AND LEVODOPA SCH TAB: 10; 100 TABLET ORAL at 08:43

## 2020-05-22 RX ADMIN — IPRATROPIUM BROMIDE AND ALBUTEROL SULFATE SCH ML: .5; 3 SOLUTION RESPIRATORY (INHALATION) at 01:20

## 2020-05-22 RX ADMIN — IPRATROPIUM BROMIDE AND ALBUTEROL SULFATE SCH: .5; 3 SOLUTION RESPIRATORY (INHALATION) at 07:15

## 2020-05-22 NOTE — INTERNAL MEDICINE PROG NOTE
Internal Medicine Subjective





- Subjective


Service Date: 05/22/20


Patient seen and examined:: without staff


Patient is:: awake, asleep


Per staff patient has:: no adverse event, no episodes of fall





Internal Medicine Objective





- Physical Exam


Vitals and I&O: 


 Vital Signs











Temp  97.3 F   05/22/20 06:39


 


Pulse  85   05/22/20 08:45


 


Resp  20   05/22/20 06:39


 


BP  126/73   05/22/20 08:45


 


Pulse Ox  91   05/22/20 06:39








 Intake & Output











 05/21/20 05/22/20 05/22/20





 18:59 06:59 18:59


 


Intake Total 800 120 


 


Balance 800 120 


 


Intake:   


 


  Oral 800 120 


 


Other:   


 


  # Voids 3 1 


 


  # Bowel Movements 1 0 











Active Medications: 


Current Medications





Acetaminophen (Tylenol)  650 mg PO Q4H PRN


   PRN Reason: Pain (Mild 1-3)


   Stop: 07/13/20 19:27


   Last Admin: 05/21/20 19:45 Dose:  650 mg


Acetaminophen (Tylenol)  650 mg PO Q4H PRN


   PRN Reason: Temperature above 101


   Stop: 07/13/20 21:41


   Last Admin: 05/17/20 13:18 Dose:  650 mg


Al Hydrox/Mg Hydrox/Simethicone (Maalox)  30 ml PO Q4HR PRN


   PRN Reason: GI DISTRESS


   Stop: 07/13/20 19:27


Albuterol/Ipratropium (Duoneb Neb)  3 ml HHN Q6HRT [facility]


   Stop: 07/14/20 00:59


   Last Admin: 05/22/20 07:15 Dose:  Not Given


Bisacodyl (Dulcolax 10 Mg Supp)  10 mg RC DAILY PRN


   PRN Reason: constipation


   Stop: 07/13/20 21:41


Carbidopa/Levodopa (Sinemet 10 Mg-100 Mg)  1 tab PO TID [facility]


   Stop: 07/14/20 08:59


   Last Admin: 05/22/20 08:43 Dose:  1 tab


Donepezil HCl (Aricept)  5 mg PO DAILY [facility]


   Stop: 07/14/20 08:59


   Last Admin: 05/22/20 08:44 Dose:  5 mg


Fish Oil (Omega 3)  1,000 mg PO DAILY [facility]


   Stop: 07/14/20 08:59


   Last Admin: 05/22/20 08:44 Dose:  1,000 mg


Ibuprofen (Motrin)  400 mg PO Q4H PRN


   PRN Reason: Pain (Severe 7-10)


   Stop: 07/13/20 19:27


Levofloxacin (Levaquin)  500 mg PO DAILY [facility]


   Stop: 07/15/20 08:59


   Last Admin: 05/22/20 08:44 Dose:  500 mg


Lorazepam (Ativan)  0.5 mg PO Q4HR PRN; Protocol


   PRN Reason: Anxiety


   Stop: 06/13/20 19:27


Multivitamins/Vitamin C (Theragran)  1 tab PO DAILY [facility]


   Stop: 07/14/20 08:59


   Last Admin: 05/22/20 08:44 Dose:  1 tab


Propranolol HCl (Inderal)  10 mg PO BID [facility]


   Stop: 07/14/20 08:59


   Last Admin: 05/22/20 08:45 Dose:  10 mg








General: weak


HEENT: NC/AT, PERRLA


Neck: Supple


Lungs: CTAB


Cardiovascular: RRR, Normal S1, Normal S2


Abdomen: soft, non-tender


Extremities: clear


Neurological: no change





Internal Medicine Assmt/Plan





- Assessment


Assessment: 


1. Pneumonia with recent fever


 2. Dementia with psychosis


 3. Agitation





- Plan


Plan: 








continue levaquin 500 mg po daily


continue duonebs q 4 hours prn


stop levaquin after completing 7 day course


cxr shows left basilar infiltrate


discharge planning begun


discussed with dr. mueller


discussed with

## 2020-05-22 NOTE — PROGRESS NOTES
DATE:  05/22/2020



SUBJECTIVE:  The patient in the hospital, 84-year-old male, at his baseline,

trying to discharge him to a lower level of care, confused, disoriented,

sleeping most of the day.  No agitation.  He cannot take care of his basic

needs, that is the main reason he is here.  Family cannot take care of him.  He

certainly cannot take care of himself.  He is too confused, disoriented, needing

a higher level of nursing care.  Advanced dementia.



Medications were reviewed.  Labs were reviewed.  Vitals were reviewed.  Blood

pressure 109/65, pulse ranges 85 to 106.



PLAN:  Continue inpatient monitoring.  Discussed with .





DD: 05/22/2020 10:43

DT: 05/22/2020 16:43

JOB# [id number]  [id number]

## 2020-05-22 NOTE — DIAGNOSTIC IMAGING REPORT
Chest x-ray single view 



History: Fever



Comparison: 05/19/2020



The heart size is normal.  No hilar or mediastinal abnormalities.  Mild

left basilar peribronchial infiltrate.



Impression: Mild left basilar infiltrate, follow-up exam is recommended.